# Patient Record
Sex: MALE
[De-identification: names, ages, dates, MRNs, and addresses within clinical notes are randomized per-mention and may not be internally consistent; named-entity substitution may affect disease eponyms.]

---

## 2020-08-18 ENCOUNTER — HOSPITAL ENCOUNTER (EMERGENCY)
Dept: HOSPITAL 88 - ER | Age: 85
Discharge: OTHER INSTITUTION | End: 2020-08-18
Payer: MEDICARE

## 2020-08-18 VITALS — HEIGHT: 65 IN | BODY MASS INDEX: 24.83 KG/M2 | WEIGHT: 149 LBS

## 2020-08-18 DIAGNOSIS — E78.00: ICD-10-CM

## 2020-08-18 DIAGNOSIS — J96.91: Primary | ICD-10-CM

## 2020-08-18 DIAGNOSIS — K21.9: ICD-10-CM

## 2020-08-18 LAB
ALBUMIN SERPL-MCNC: 2.4 G/DL (ref 3.5–5)
ALBUMIN/GLOB SERPL: 0.6 {RATIO} (ref 0.8–2)
ALP SERPL-CCNC: 69 IU/L (ref 40–150)
ALT SERPL-CCNC: 20 IU/L (ref 0–55)
ANION GAP SERPL CALC-SCNC: 23.4 MMOL/L (ref 8–16)
BACTERIA URNS QL MICRO: (no result) /HPF
BASE EXCESS BLDA CALC-SCNC: -4 MMOL/L (ref -2–3)
BASOPHILS # BLD AUTO: 0.1 10*3/UL (ref 0–0.1)
BASOPHILS NFR BLD AUTO: 0.5 % (ref 0–1)
BILIRUB UR QL: NEGATIVE
BUN SERPL-MCNC: 116 MG/DL (ref 7–26)
BUN/CREAT SERPL: 28 (ref 6–25)
CALCIUM SERPL-MCNC: 8.9 MG/DL (ref 8.4–10.2)
CHLORIDE SERPL-SCNC: 114 MMOL/L (ref 98–107)
CLARITY UR: CLEAR
CO2 BLDCOA CALC-SCNC: 22 MMOL/L
CO2 SERPL-SCNC: 20 MMOL/L (ref 22–29)
COLOR UR: YELLOW
DEPRECATED NEUTROPHILS # BLD AUTO: 19.2 10*3/UL (ref 2.1–6.9)
DEPRECATED RBC URNS MANUAL-ACNC: (no result) /HPF (ref 0–5)
EGFRCR SERPLBLD CKD-EPI 2021: 14 ML/MIN (ref 60–?)
EOSINOPHIL # BLD AUTO: 0 10*3/UL (ref 0–0.4)
EOSINOPHIL NFR BLD AUTO: 0.1 % (ref 0–6)
EPI CELLS URNS QL MICRO: (no result) /LPF
ERYTHROCYTE [DISTWIDTH] IN CORD BLOOD: 13.5 % (ref 11.7–14.4)
GLOBULIN PLAS-MCNC: 4.2 G/DL (ref 2.3–3.5)
GLUCOSE SERPLBLD-MCNC: 131 MG/DL (ref 74–118)
HCO3 BLDA-SCNC: 21 MMOL/L (ref 22–26)
HCT VFR BLD AUTO: 42.9 % (ref 38.2–49.6)
HGB BLD-MCNC: 14.3 G/DL (ref 14–18)
KETONES UR QL STRIP.AUTO: (no result)
LEUKOCYTE ESTERASE UR QL STRIP.AUTO: NEGATIVE
LYMPHOCYTES # BLD: 0.8 10*3/UL (ref 1–3.2)
LYMPHOCYTES NFR BLD AUTO: 3.6 % (ref 18–39.1)
MCH RBC QN AUTO: 32.2 PG (ref 28–32)
MCHC RBC AUTO-ENTMCNC: 33.3 G/DL (ref 31–35)
MCV RBC AUTO: 96.6 FL (ref 81–99)
MONOCYTES # BLD AUTO: 1.8 10*3/UL (ref 0.2–0.8)
MONOCYTES NFR BLD AUTO: 8.2 % (ref 4.4–11.3)
MUCOUS THREADS URNS QL MICRO: (no result)
NEUTS SEG NFR BLD AUTO: 86.8 % (ref 38.7–80)
NITRITE UR QL STRIP.AUTO: NEGATIVE
PCO2 BLDA: 31 MMHG (ref 35–45)
PCO2 BLDA: 56 MMHG (ref 80–105)
PH BLDA: 7.43 [PH] (ref 7.35–7.45)
PLATELET # BLD AUTO: 134 X10E3/UL (ref 140–360)
POTASSIUM SERPL-SCNC: 4.4 MMOL/L (ref 3.5–5.1)
PROT UR QL STRIP.AUTO: (no result)
RBC # BLD AUTO: 4.44 X10E6/UL (ref 4.3–5.7)
SAO2 % BLDA: 90 % (ref 95–98)
SODIUM SERPL-SCNC: 153 MMOL/L (ref 136–145)
SP GR UR STRIP: 1.02 (ref 1.01–1.02)
UROBILINOGEN UR STRIP-MCNC: 0.2 MG/DL (ref 0.2–1)
WBC #/AREA URNS HPF: (no result) /HPF (ref 0–5)

## 2020-08-18 PROCEDURE — 99285 EMERGENCY DEPT VISIT HI MDM: CPT

## 2020-08-18 PROCEDURE — 51700 IRRIGATION OF BLADDER: CPT

## 2020-08-18 PROCEDURE — 82805 BLOOD GASES W/O2 SATURATION: CPT

## 2020-08-18 PROCEDURE — 81001 URINALYSIS AUTO W/SCOPE: CPT

## 2020-08-18 PROCEDURE — 80053 COMPREHEN METABOLIC PANEL: CPT

## 2020-08-18 PROCEDURE — 31500 INSERT EMERGENCY AIRWAY: CPT

## 2020-08-18 PROCEDURE — 72125 CT NECK SPINE W/O DYE: CPT

## 2020-08-18 PROCEDURE — 94002 VENT MGMT INPAT INIT DAY: CPT

## 2020-08-18 PROCEDURE — 36415 COLL VENOUS BLD VENIPUNCTURE: CPT

## 2020-08-18 PROCEDURE — 87040 BLOOD CULTURE FOR BACTERIA: CPT

## 2020-08-18 PROCEDURE — 83880 ASSAY OF NATRIURETIC PEPTIDE: CPT

## 2020-08-18 PROCEDURE — 71250 CT THORAX DX C-: CPT

## 2020-08-18 PROCEDURE — 87086 URINE CULTURE/COLONY COUNT: CPT

## 2020-08-18 PROCEDURE — 93005 ELECTROCARDIOGRAM TRACING: CPT

## 2020-08-18 PROCEDURE — 84484 ASSAY OF TROPONIN QUANT: CPT

## 2020-08-18 PROCEDURE — 71045 X-RAY EXAM CHEST 1 VIEW: CPT

## 2020-08-18 PROCEDURE — 70450 CT HEAD/BRAIN W/O DYE: CPT

## 2020-08-18 PROCEDURE — 94003 VENT MGMT INPAT SUBQ DAY: CPT

## 2020-08-18 PROCEDURE — 83605 ASSAY OF LACTIC ACID: CPT

## 2020-08-18 PROCEDURE — 85025 COMPLETE CBC W/AUTO DIFF WBC: CPT

## 2020-08-18 NOTE — NUR
Midazolam drip obtained from pharmacy for sedation for intubation. Bag was 
spiked but the medication was not needed. Medication wasted due to being 
spiked. Kwan Ruiz RN ER director notified.

## 2020-08-18 NOTE — XMS REPORT
Continuity of Care Document

                             Created on: 2020



AUDREY HARPER

External Reference #: [id number]

: 1934

Sex: Male



Demographics





                          Address                   [address] [place]  [address]

 

                          Home Phone                [phone]

 

                          Preferred Language        English

 

                          Marital Status            Unknown

 

                          Hinduism Affiliation     Unknown

 

                          Race                      Unknown

 

                          Ethnic Group              Unknown





Author





                          Author                    [facility]

t

 

                          Organization              [facility]

 

                          Address                   [address]

[place]  [address]



 

                          Phone                     Unavailable







Support





                Name            Relationship    Address         Phone

 

                    NIKA VENTURA         PRS                 UNKNOWN

[place]  [address]                     [phone]

 

                    HERNESTO VENTURA        PRS                 [address] [place]  [address]                     [phone]

 

                    NONE,  GIVEN        PRS                 [address] [place]  [address]                     [phone]







Care Team Providers





                    Care Team Member Name Role                Phone

 

                          Unavailable               Unavailable







Payers





           Payer Name Policy Type Policy Number Effective Date Expiration Date S

ource







Problems

This patient has no known problems.



Allergies, Adverse Reactions, Alerts





        Allergy Name Allergy Type Status  Severity Reaction(s) Onset Date Inacti

ve Date 

Treating Clinician        Comments                  Source

 

       No Known Allergies DA     Active U             2019-09-15 00:00:00       

               [facility]

 

       No Known Contrast Allergies DA     Active U             2007 00:00:

00                      [facility]

 

       No Known Food Allergies DA     Active U             2007 00:00:00  

                    [facility]

 

       No Known Other Allergies DA     Active U             2007 00:00:00 

                     [facility]

 

       TETANUS DA     Active U             2007 00:00:00                  

    [facility]







Medications

This patient has no known medications.



Procedures

This patient has no known procedures.



Results





           Test Description Test Time  Test Comments Results    Result Comments 

Source

 

                INTERVERTEBRAL DISC 2020 15:43:00                 

--------------------------------------------------------------------------------

------------RUN DATE: 20                         [place] [facility]           
              PAGE 1   RUN TIME: 1543                            Specimen 
Inquiry                    RUN USER: INTERFACE                                  
                        
----------------------------------------------------------------
----------------------------PATIENT: HARPER VENTURA                  ACCT #: 
[id number] LOC:  V.ORTHO    U #: [id number]                                   
   AGE/SX: 86/M         ROOM: [facility]     RE20REG DR:  Tang Lei MD       :    [birth year]     BED:  A          DIS: 20             
                         STATUS: DIS IN       TLOC:           
----------------------------
---------------------------------------------------------------- SPEC #: 
[id number]     RECD:      STATUS:  CHITRA           REJACKIE #: 
[id number]                           ELIJAH: 20-         SUBM DR: Tang Lei MD        ENTERED:      SP TYPE: INT DISC       OTHR DR: 
No Primary or Family Physician                                                  
         Derek Higgins MD, Dang Thanh MD Qureshi, Salah Uddin MDORDERED:  GROSS                                     
                                        COPIES TO:   No Primary or Family 
Physician    Derek Higgins MD   [address] [place] [address]   
[phone]    Edna Pino MD   [address] [place] [address] 
[address] [phone]    Lisa Dover MD   [address]  [address]
  [place] [address] [phone]    Tang Lei MD   [address] 
[place] [address]   [phone] PROCEDURES: GROSS () 
TISSUES:           CERVICAL VERTEBRA, NOS - DISC         CLINICAL HISTORY    
COLLECTION DATE: 20       CERVICAL SPINAL STENOSIS WITH MYELOPATHY         
                          ** CONTINUED ON NEXT PAGE ** 
--------------------------------------------------------------------------------

------------RUN DATE: 20                         [facility]           
              PAGE 2   RUN TIME: 1543                            Specimen 
Inquiry                    RUN USER: INTERFACE                                  
                        
--------------------------------------------------------------------------------

------------SPEC #: [id number]    PATIENT: HARPER VENTURA                   
#[id number]  
(Continued)---------------------------------------------------------------------

-----------------------          FINAL DIAGNOSIS    Cervical disc, C4-5 and C5-
6, anterior cervical discectomy:        INTERVERTEBRAL DISC MATERIAL        BONE
       NEGATIVE FOR MALIGNANCY            DMW/kamryn   D   [address], [address]           
MACROSCOPIC    The specimen is received in formalin labeled with the patient's 
name, "cervical   disc" and consists of irregular fragments of light tan fibrous
type tissue   and small fragments of possible bone.  The specimen measures 3.0 x
2.5 x 0.5 cm   in aggregate.  Representative portions of tissue are submitted 
for   decalcification and follow-up histologic evaluation in a single cassette. 
     GROSS PERFORMED AT [facility]   [address] [place] [address]   [phone]       
   MICROSCOPIC    All of the stains, including any controls performed, stain   
appropriately.       MICROSCOPIC PERFORMED AT [facility]   [address] [place] [address]   [phone]
        PERFORMING SITE    Diagnosis performed at:        [facility]        [address] [place] [address]        
[phone]--------------------------------------------------------------------

------------------------ Signed SIGNATURE ON FILE                        
Alem Walker MD 20 1543 
--------------------------------------------------------------------------------

------------                                    ** END OF REPORT **             

                

 

                - XR C-SPINE 2-3 VIEWS 2020 09:41:00                  FAX:

 Derek Brito MD  

[phone]    Gleason: B   St: [facility] FAX: Tang Aguilar [facility]  
[phone]   
------------------------------------------------------------------------------- 
Name:   HARPER VENTURA                     [facility]                     :
1934  Age/S: 86/M           [address] [facility]                Unit #: 
[id number]      Loc: [address]       [place]  [address]              Phys: 
Derek Higgins MD                                                 Acct: 
[id number] Dis Date:               Status: ADM IN                             
   PHONE #: [phone]     Exam Date:     2020     09                 
 FAX #: [phone]     Reason: Status post fusion                             
   EXAMS:                                               CPT CODE:      [id number]
XR C-SPINE 2-3 VIEWS                       [address]38                    HISTORY: 
Post fusion.               Location: [facility].               COMPARISON: None 
available.               Cervical fusion with metallic plate and bone graft from
C4 through C6       is in gross anatomic alignment although limited on the 
lateral view       for C5 and C6 due to overlapping shoulder. Uncovertebral mora
ints are       narrowed. Soft tissue swelling from surgery. Osteopenia. Lung 
apices       are clear.                 IMPRESSION:                   Gross 
anatomic alignment from C4 through C6 with metallic internal         fixation 
plate and bone graft. C5 and C6 are partially obscured by         overlapping 
shoulder on the lateral view.          ** Electronically Signed by MARTI Leo on 2020 at 0941 **                      Reported and signed by: 
Raymond Leo M.D.                      CC: Derek Higgins MD; Tang Lei                                                                           
                    Technologist: May Lu, RT(R); Cathleen Dyer RT(R)    
        Trnscrd Date/Time/By: 2020 (0941) : By: [email or web address]           Orig 
Print D/T: S: 2020 (2428)                         PAGE  1                 
     Signed Report                                                           

 

                    COVID 19 Asymptomatic IH AG 2020 14:01:00   

 

                                        Test Item

 

             COVID 19 Asymptomatic IH AG (test code = COVNONPUIAG) NEGATIVE     

                           





- MRI C-SPINE W/O CONT2020-08-10 15:40:00 FAX: Derek Brito MD  
[phone]    Gleason: B   St: ADM FAX: Tang Aguilar  
[phone]   ----------------------------------------
---------------------------------------  Name:   HARPER VENTURA                   
 [facility]                     : 1934  Age/S: 86/M           [address]                Unit #: [id number]      Loc: [address]        [place],  
[place]  [address]              Phys: Derek Higgins MD                                
                Acct: [id number] Dis Date:               Status: ADM IN       
                         PHONE #: [phone]     Exam Date:     08/10/2020    
1522                   FAX #: [phone]     Reason: r/o cord contusion       
                         EXAMS:                                               
CPT CODE:      [id number] MRI C-SPINE W/O CONT                       [address]       
            REASON FOR EXAM: r/o cord contusion               Exam Order Date: 
8/10/2020 12:52 PM               Attending M.D.: Derek Higgins MD            
  Comparison: CT scan of the cervical spine the previous afternoon              
Procedure:  - MRI C-SPINE W/O CONT               FINDINGS: Sagittal and axial 
images of the cervical spine were       obtained in in T1, T2, and proton den
sity with fat saturation. No       intravenous gadolinium was given.            
  There is no fracture or spondylolisthesis of the cervical spine.              
The heights of the cervical spine are preserved. However there is       height 
loss of the intervertebral discs throughout the cervical spine.               C
2-C3: No foraminal narrowing or central canal narrowing.       C3-C4: Disc bulge
causes mild central canal narrowing and moderate to       severe bilateral fora
pavan narrowing.       C4-C5: Disc osteophyte complex causes severe narrowing of
the central       canal and bilateral neuroforamina.       C5-C6: Disc osteophy
te complex with facet degeneration results in       severe narrowing of the cent
ral canal and bilateral foramina.       C6-C7: Disc osteophyte complex results i
n moderate to severe central       canal narrowing and moderate to severe bilate
ral foraminal narrowing.       C7-T1: Central canal and neuroforamina are widely
patent.               There is increased T2 signal of the spinal cord from the 
level of C4       to the level of C6.                 IMPRESSION:         Severe
degenerative changes of the cervical spine with multilevel         foraminal and
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
ibed above.                   Location: [facility]          ** Electronically Signed by
Osmany Valenzuela MD on 08/10/2020 at 1540 **                      Reported and signed
by: Osmany Valenzuela MD      PAGE  1                       Signed Report             
      (CONTINUED)  FAX: Derek Brito MD  [phone]    Gleason:   
St: ADM FAX: Y       Tang Lei  [phone]   -------------------
------------------------------------------------------------  Name:   PAULA VENTURA                     [facility]                     : 1934  Age/S
: 86/M           [address] [facility]                Unit #: [id number]      Loc: [place]  [address]              Phys: Derek Hgigins MD          
                                      Acct: [id number] Dis Date:              
Status: ADM IN                                 PHONE #: [phone]     Exam 
Date:     08/10/2020     1522                   FAX #: [phone]     Reason: 
r/o cord contusion                                 EXAMS:                       
                       CPT CODE:      [id number] MRI C-SPINE W/O CONT            
          [address]               <Continued>                                       
CC: Derek Higgins MD; Tang Lei [facility]                                    
                                                           Technologist: RT TENZIN - MRI                                 Trnscrd Date/Time/By: 
08/10/2020 (1540) : By: [email or web address]          Orig Print D/T: S: 08/10/2020 (7505)
                        PAGE  2                       Signed Report             
                 - CT HEAD/BRAIN W/O CONT2020-08-10 07:24:00  Name: HARPER VENTURA
                     [facility]                     : 1934 Age/S: 
86  / M         [address] [facility]                Unit #: [id number]     Loc:     
         [place]  [address]              Phys: Heavenly Chaves NP               
                                    Acct: [id number]  Dis Date:               
Status: ADM IN                                  PHONE #: [phone]     Exam 
Date: 08/10/2020  0430                     FAX #: [phone]      Reason: SAH 
Eval                                            EXAMS:                          
                    CPT CODE:      [id number] CT HEAD/BRAIN W/O CONT             
       [address]                    HISTORY: Follow-up subarachnoid hemorrhage.     
         COMPARISON: Head CT from previous day.                       CT brain 
without contrast: Automated exposure control.               Location: [facility].      
        Trace right superior frontal subarachnoid hemorrhage noted again and    
  is unchanged. No new hemorrhage. No extra-axial collections are noted.       
No acute territorial vascular infarction is noted.               The sulci, 
gyri, ventricles and subarachnoid spaces and the basilar       cisterns are 
normal for patient's age. No herniation or hydrocephalus       or midline shift 
is noted.               Mild periventricular ischemic gliosis is noted. Age-
appropriate       atrophy is noted as well.               Portions of the 
visualized paranasal sinuses are normal.               No obvious bony calvarial
defect is noted.                 IMPRESSION:                   Trace right 
superior frontal subarachnoid hemorrhage is noted again         and unchanged. 
No new hemorrhage. No extra-axial collections.                   No acute alfonso
torial vascular infarction.                   No herniation or hydrocephalus or 
midline shift.                    Chronic white matter ischemic disease and atro
phy .                              ** Electronically Signed by MARTI Leo
on 08/10/2020 at 0724 **                      Reported and signed by: Raymond munoz M.D.          PAGE  1                       Signed Report                  
 (CONTINUED)   Name: HARPER VENTURA                      [facility]           
         : 1934 Age/S: 86  / M         [address]                
Unit #: [id number]     Loc:               [place]  [address]              Ph
ys: Heavenly Chaves NP                                                    Acct: [id number]
[id number]  Dis Date:               Status: ADM IN                              
   PHONE #: [phone]     Exam Date: 08/10/2020  043                     FAX
#: [phone]      Reason: SAH Eval                                           
EXAMS:                                               CPT CODE:      [id number] CT
HEAD/BRAIN W/O CONT                     [address]               <Continued>         
                             CC: Heavenly Chaves NP; Tang Lei          
                                                                                
       Technologist:NELIDA LAI, RT; Marquis WATKINS  CTDI:        DLP:        
Trnscb Date/Time: 08/10/2020 (724) t.[email or web address]                        Orig Print 
D/T: S: 08/10/2020 (727)      PAGE  2                       Signed Report      
                        COMPREHENSIVE METABOLIC PANEL2020-08-10 04:05:00* 



             Test Item    Value        Reference Range Interpretation Comments

 

             SODIUM (test code = NA) 138 mmol/L   136-145      N             

 

             POTASSIUM (test code = K) 3.8 mmol/L   3.5-5.1      N             

 

             CHLORIDE (test code = CL) 105.0 mmol/L        N             

 

             CARBON DIOXIDE (test code = CO2) 25.0 mmol/L  21-32        N       

      

 

             ANION GAP (test code = GAP) 11.8         10-20        N            

 

 

             GLUCOSE (test code = GLU) 92 mg/dL            N             

 

             BLOOD UREA NITROGEN (test code = BUN) 12 mg/dL     7-18         N  

           

 

             GLOMERULAR FILTRATION RATE (test code = GFR) > 60 mL/min  >=60     

                 Estimated GFR by

using Modified MDRD formula.Chronic kidney disease is defined as either kidney 
damageor GFR <60 mL/min/1.73 m2 for >3 months.

 

             CREATININE (test code = CREAT) 0.60 mg/dL   0.7-1.3      L         

    

 

             BUN/CREATININE RATIO (test code = BUN/CREA) 21.2         10-20     

   H             

 

             TOTAL PROTEIN (test code = PROT) 6.3 gram/dL  6.4-8.2      L       

      

 

             ALBUMIN (test code = ALB) 3.0 g/dL     3.4-5.0      L             

 

             GLOBULIN (test code = GLOB) 3.3 gram/dL  2.7-4.2      N            

 

 

             ALBUMIN/GLOBULIN RATIO (test code = A/G) 0.9          0.75-1.50    

N             

 

             CALCIUM (test code = CA) 8.0 mg/dL    8.5-10.1     L             

 

             BILIRUBIN TOTAL (test code = BILT) 0.60 mg/dL   0.0-1.0      N     

        

 

             SGOT/AST (test code = AST) 16 IUnit/L   15-37        N             

 

             SGPT/ALT (test code = ALT) 15 IUnit/L   12-78        N             

 

             ALKALINE PHOSPHATASE TOTAL (test code = ALKP) 66 IUnit/L     

     N            **Note change 

in reference range due to change in reagent.**





PHOSPHORUS2020-08-10 04:05:00* 



             Test Item    Value        Reference Range Interpretation Comments

 

             PHOSPHORUS (test code = PHOS) 3.4 mg/dL    2.5-4.9      N          

   





MAGNESIUM2020-08-10 04:05:00* 



             Test Item    Value        Reference Range Interpretation Comments

 

             MAGNESIUM (test code = MAG) 2.4 mg/dL    1.8-2.4      N            

 





CALCIUM IONIZED2020-08-10 04:05:00* 



             Test Item    Value        Reference Range Interpretation Comments

 

             CALCIUM IONIZED (test code = LALO) 1.18 mmol/L  1.12-1.32    N      

       





COMPREHENSIVE METABOLIC PANEL2020-08-10 03:34:00* 



             Test Item    Value        Reference Range Interpretation Comments

 

             SODIUM (test code = NA) 138 mmol/L   136-145      N             

 

             POTASSIUM (test code = K) 3.8 mmol/L   3.5-5.1      N             

 

             CHLORIDE (test code = CL) 105.0 mmol/L        N             

 

             CARBON DIOXIDE (test code = CO2) 25.0 mmol/L  21-32        N       

      

 

             ANION GAP (test code = GAP) 11.8         10-20        N            

 

 

             GLUCOSE (test code = GLU) 92 mg/dL            N             

 

             BLOOD UREA NITROGEN (test code = BUN) 12 mg/dL     7-18         N  

           

 

             GLOMERULAR FILTRATION RATE (test code = GFR) > 60 mL/min  >=60     

                 Estimated GFR by

using Modified MDRD formula.Chronic kidney disease is defined as either kidney 
damageor GFR <60 mL/min/1.73 m2 for >3 months.

 

             CREATININE (test code = CREAT) 0.60 mg/dL   0.7-1.3      L         

    

 

             BUN/CREATININE RATIO (test code = BUN/CREA) 21.2         10-20     

   H             

 

             TOTAL PROTEIN (test code = PROT) 6.3 gram/dL  6.4-8.2      L       

      

 

             ALBUMIN (test code = ALB) 3.0 g/dL     3.4-5.0      L             

 

             GLOBULIN (test code = GLOB) 3.3 gram/dL  2.7-4.2      N            

 

 

             ALBUMIN/GLOBULIN RATIO (test code = A/G) 0.9          0.75-1.50    

N             

 

             CALCIUM (test code = CA) 8.0 mg/dL    8.5-10.1     L             

 

             BILIRUBIN TOTAL (test code = BILT) 0.60 mg/dL   0.0-1.0      N     

        

 

             SGOT/AST (test code = AST) 16 IUnit/L   15-37        N             

 

             SGPT/ALT (test code = ALT) 15 IUnit/L   12-78        N             

 

             ALKALINE PHOSPHATASE TOTAL (test code = ALKP) 66 IUnit/L     

     N            **Note change 

in reference range due to change in reagent.**





PHOSPHORUS2020-08-10 03:34:00* 



             Test Item    Value        Reference Range Interpretation Comments

 

             PHOSPHORUS (test code = PHOS) 3.4 mg/dL    2.5-4.9      N          

   





MAGNESIUM2020-08-10 03:34:00* 



             Test Item    Value        Reference Range Interpretation Comments

 

             MAGNESIUM (test code = MAG) 2.4 mg/dL    1.8-2.4      N            

 





CALCIUM IONIZED2020-08-10 03:34:00* 



             Test Item    Value        Reference Range Interpretation Comments

 

             CALCIUM IONIZED (test code = LALO)  mmol/L      1.12-1.32           

       





COMPREHENSIVE METABOLIC PANEL2020-08-10 03:13:00* 



             Test Item    Value        Reference Range Interpretation Comments

 

             SODIUM (test code = NA) 138 mmol/L   136-145      N             

 

             POTASSIUM (test code = K) 3.8 mmol/L   3.5-5.1      N             

 

             CHLORIDE (test code = CL) 105.0 mmol/L        N             

 

             CARBON DIOXIDE (test code = CO2)  mmol/L      21-32                

      

 

             ANION GAP (test code = GAP)              10-20                     

 

 

             GLUCOSE (test code = GLU)  mg/dL                            

 

             BLOOD UREA NITROGEN (test code = BUN)  mg/dL       7-18            

           

 

             GLOMERULAR FILTRATION RATE (test code = GFR)  mL/min      >=60     

                  

 

             CREATININE (test code = CREAT)  mg/dL       0.7-1.3                

    

 

             BUN/CREATININE RATIO (test code = BUN/CREA)              10-20     

                 

 

             TOTAL PROTEIN (test code = PROT)  gram/dL     6.4-8.2              

      

 

             ALBUMIN (test code = ALB)  g/dL        3.4-5.0                    

 

             GLOBULIN (test code = GLOB)  gram/dL     2.7-4.2                   

 

 

             ALBUMIN/GLOBULIN RATIO (test code = A/G)              0.75-1.50    

              

 

             CALCIUM (test code = CA)  mg/dL       8.5-10.1                   

 

             BILIRUBIN TOTAL (test code = BILT)  mg/dL       0.0-1.0            

        

 

             SGOT/AST (test code = AST)  IUnit/L     15-37                      

 

             SGPT/ALT (test code = ALT)  IUnit/L     12-78                      

 

             ALKALINE PHOSPHATASE TOTAL (test code = ALKP)  IUnit/L       

                   





PHOSPHORUS2020-08-10 03:13:00* 



             Test Item    Value        Reference Range Interpretation Comments

 

             PHOSPHORUS (test code = PHOS)  mg/dL       2.5-4.9                 

   





MAGNESIUM2020-08-10 03:13:00* 



             Test Item    Value        Reference Range Interpretation Comments

 

             MAGNESIUM (test code = MAG)  mg/dL       1.8-2.4                   

 





CALCIUM IONIZED2020-08-10 03:13:00* 



             Test Item    Value        Reference Range Interpretation Comments

 

             CALCIUM IONIZED (test code = LALO)  mmol/L      1.12-1.32           

       





CBC W/AUTO DIFF2020-10 03:08:00* 



             Test Item    Value        Reference Range Interpretation Comments

 

             WHITE BLOOD CELL (test code = WBC) 9.7 K/mm3    4.5-12.5     N     

        

 

             RED BLOOD CELL (test code = RBC) 4.23 mill/mm3 4.0-5.8      N      

       

 

             HEMOGLOBIN (test code = HGB) 13.6 gram/dL 13.0-17.5    N           

  

 

             HEMATOCRIT (test code = HCT) 40.7 %       42.0-52.0    L           

  

 

             MEAN CELL VOLUME (test code = MCV) 96.2 fL      80-98        N     

        

 

             MEAN CELL HGB (test code = MCH) 32.2 picogram 27.0-33.0    N       

      

 

             MEAN CELL HGB CONCETRATION (test code = MCHC) 33.4 gram/dL 33.0-36.

0    N             

 

             RED CELL DISTRIBUTION WIDTH (test code = RDW) 12.5 %       11.6-16.

2    N             

 

             RED CELL DISTRIBUTION WIDTH SD (test code = RDW-SD) 43.9 fL      37

.0-51.0    N             

 

             PLATELET COUNT (test code = PLT) 197 K/mm3    150-450      N       

      

 

             MEAN PLATELET VOLUME (test code = MPV) 10.4 fL      6.7-11.0     N 

            

 

             NEUTROPHIL % (test code = NT%) 69.8 %       39.0-69.0    H         

    

 

             IMMATURE GRANULOCYTE % (test code = IG%) 0.3 %        0.0-5.0      

N             

 

             LYMPHOCYTE % (test code = LY%) 18.7 %       25.0-55.0    L         

    

 

             MONOCYTE % (test code = MO%) 10.3 %       0.0-10.0     H           

  

 

             EOSINOPHIL % (test code = EO%) 0.5 %        0.0-5.0      N         

    

 

             BASOPHIL % (test code = BA%) 0.4 %        0.0-1.0      N           

  

 

             NUCLEATED RBC % (test code = NRBC%) 0.0 %        0-0          N    

         

 

             NEUTROPHIL # (test code = NT#) 6.77 K/mm3   1.8-7.7      N         

    

 

             IMMATURE GRANULOCYTE # (test code = IG#) 0.03 x10 3/uL 0-0.03      

 N             

 

             LYMPHOCYTE # (test code = LY#) 1.82 K/mm3   1.0-5.0      N         

    

 

             MONOCYTE # (test code = MO#) 1.00 K/mm3   0-0.8        H           

  

 

             EOSINOPHIL # (test code = EO#) 0.05 K/mm3   0.0-0.5      N         

    

 

             BASOPHIL # (test code = BA#) 0.04 K/mm3   0.0-0.2      N           

  

 

             NUCLEATED RBC # (test code = NRBC#) 0.00 K/mm3   0.0-0.1      N    

         

 

             MANUAL DIFF REQUIRED (test code = MDIFF) NO                        

              





PROTHROMBIN TIME2020-08-10 03:03:00* 



             Test Item    Value        Reference Range Interpretation Comments

 

             PROTHROMBIN TIME PATIENT (test code = PTP) 13.1 seconds 9.0-14.0   

  N             

 

             INTERNATIONAL NORMAL RATIO (test code = INR) 1.1          0.8-1.2  

    N            The therapeutic range

for oral anticoagulant therapy formost indications is an international 
normalized ratio (INR)of between 2.0 and 3.0.  The recommended therapeutic 
INRrange for various clinical situations is listed 
below:_________________________________________________________Clinical 
Situation                          INR 
range_________________________________________________________ Pulmonary e
mbolism treatment              (2.0-3.0)Venous thrombosis treatmentVenous 
thrombosis prophylaxis (high risk surgery)Prevention of systemic embolism from: 
       Acute myocardial infarction         Valvular heart disease         Atrial
fibrillation Mechanical prosthetic heart valves          (2.5-3.5)





IS PATIENT ON ANTICOAGULANTS? N- DUP AB/PEL/SC [id number] 23:26:00  [facility]                     : 
1934 Age/S: 86  / M         [address] [facility]                [address]: [id number]     Loc:               [place]  [address]              Phys: Tang Lei MD                                             Acct: [id number]  Dis
Date:               Status: ADM IN                                  PHONE #: [phone]
[phone]     Exam Date: 2020  0990                     FAX #: [phone]
[id number]      Reason: ENLARGE SCROTUM                                     EXAMS:     
                                         CPT CODE:      [id number] DUP AB/PEL/SC 
COMP                         87279                    HISTORY: Pain       Locat
ion: C3        FINDINGS:               Sonographic images of the scrotum were ob
tained.  The testicles are       mildly heterogeneous in appearance bilaterally 
with testicular flow       noted bilaterally.  Small right hydrocele in the larg
e left hydrocele       is demonstrated.  No discrete testicular mass identified.
                IMPRESSION:                   1. Small right-sided hydrocele wi
th large left hydrocele.         2.  Normal testicular flow bilaterally.        
 ** Electronically Signed by Jerzy Ruiz MD on 2020 at 2326 **          
           Reported and signed by: Jerzy Ruiz MD                           
CC: Tang Lei                                                         
                                                           Technologist: NEREIDA FOSTER                                       Trnscb Date/Time: 2020 (2
326) [id number]                       Orig Print D/T: S: 2020 (0125)     P
robe:                       PAGE  1                       Signed Report         
                     - US SCROTUM AND [id number] 23:26:00  Name: 
HARPER VENTURA                      [facility]                     : 
1934 Age/S: 86  / M         [address]                [address] #: [id number]     Loc:               [place]  [address]              Phys: Brunilda Wilkinson MD                                                Acct: [id number]  Di
s Date:               Status: ADM IN                                  PHONE #: [phone]
[phone]     Exam Date: 2020                     FAX #: [phone]
[phone]      Reason: scrotal enlargement, urinary retention              EXAMS:     
                                         CPT CODE:      [id number] US SCROTUM AND
CNTS                        08302                    HISTORY: Pain       Locat
ion: C3        FINDINGS:               Sonographic images of the scrotum were ob
tained.  The testicles are       mildly heterogeneous in appearance bilaterally 
with testicular flow       noted bilaterally.  Small right hydrocele in the larg
e left hydrocele       is demonstrated.  No discrete testicular mass identified.
                IMPRESSION:                   1. Small right-sided hydrocele wi
th large left hydrocele.         2.  Normal testicular flow bilaterally.        
 ** Electronically Signed by Jerzy Ruiz MD on 2020 at 2326 **          
           Reported and signed by: Jerzy Ruiz MD                           
CC: Laura Wilkinson MD; Tang Lei                                   
                                                           Technologist: NEREIDA FOSTER                                       Trnscb Date/Time: 2020 (2
326) [id number]                       Orig Print D/T: S: 2020 (0107)     P
robe:                       PAGE  1                       Signed Report         
                     URINALYSIS [id number] 19:00:00* 



             Test Item    Value        Reference Range Interpretation Comments

 

             UA COLOR (test code = COLU) COLORLESS    YELLOW       A            

 

 

             UA APPEARANCE (test code = APPU) CLEAR        CLEAR                

      

 

             UA GLUCOSE DIPSTICK (test code = DGLUU) NEGATIVE mg/dL NEGATIVE    

               

 

             UA BILIRUBIN DIPSTICK (test code = BILU) NEGATIVE mg/dL NEGATIVE   

                

 

             UA KETONE DIPSTICK (test code = KETU) TRACE mg/dL  NEGATIVE     A  

           

 

             UA SPECIFIC GRAVITY (test code = SGU) 1.005        1.001-1.035     

           

 

             UA BLOOD DIPSTICK (test code = JANE) 0.03 mg/dL (Trace) mg/dL NEGATI

VE     A             

 

             UA PH DIPSTICK (test code = NOEMÍ) 7.5          5.0-8.0              

      

 

             UA PROTEIN DIPSTICK (test code = PROU) NEGATIVE mg/dL NEGATIVE     

              

 

             UA UROBILINIOGEN DIPSTICK (test code = URO) Normal mg/dL NEGATIVE  

                 

 

             UA NITRITE DIPSTICK (test code = CAROL) NEGATIVE     NEGATIVE       

            

 

             UA LEUKOCYTE ESTERASE W REFLEX (test code = LEUUR) NEGATIVE Vick/uL 

NEGATIVE                   

 

             UA WBC (test code = WBCU)  per HPF     0-5                        

 

             UA RBC (test code = RBCU)  per HPF     0-5                        

 

             UA EPITHELIAL CELLS (test code = EPIU)  per HPF     Few            

            

 

             UA BACTERIA (test code = BACU)  per HPF     NONE                   

    





Urine Source? Clean CatchDRUGS OF ABUSE SCREEN [id number] 19:00:00* 



             Test Item    Value        Reference Range Interpretation Comments

 

             URN COCAINE (test code = COCAURN) NEGATIVE     <300 ng/mL          

       

 

             URN CANNABINOIDS (test code = CANNABURN) NEGATIVE     <50 ng/mL    

              

 

             URN AMPHETAMINE (test code = AMPHETURN) NEGATIVE     <1000 ng/mL   

             

 

             URN BARBITURATE (test code = BARBITURN) NEGATIVE     <200 ng/mL    

             

 

             URN BENZODIAZEPINE (test code = BENZOURN) NEGATIVE     <200 ng/mL  

               

 

             URN OPIATES (test code = OPIATURN) NEGATIVE     <300 ng/mL         

        

 

             URN PHENCYCLIDINE (PCP) (test code = PHENCURN) NEGATIVE     <25 ng/

mL                  

 

             URN METHADONE (test code = METHAURN) NEGATIVE     <300 ng/mL       

          





Urine Source? Clean CatchURINALYSIS [id number] 19:00:00* 



             Test Item    Value        Reference Range Interpretation Comments

 

             UA COLOR (test code = COLU) COLORLESS    YELLOW       A            

 

 

             UA APPEARANCE (test code = APPU) CLEAR        CLEAR                

      

 

             UA GLUCOSE DIPSTICK (test code = DGLUU) NEGATIVE mg/dL NEGATIVE    

               

 

             UA BILIRUBIN DIPSTICK (test code = BILU) NEGATIVE mg/dL NEGATIVE   

                

 

             UA KETONE DIPSTICK (test code = KETU) TRACE mg/dL  NEGATIVE     A  

           

 

             UA SPECIFIC GRAVITY (test code = SGU) 1.005        1.001-1.035     

           

 

             UA BLOOD DIPSTICK (test code = JANE) 0.03 mg/dL (Trace) mg/dL NEGATI

VE     A             

 

             UA PH DIPSTICK (test code = NOEMÍ) 7.5          5.0-8.0              

      

 

             UA PROTEIN DIPSTICK (test code = PROU) NEGATIVE mg/dL NEGATIVE     

              

 

             UA UROBILINIOGEN DIPSTICK (test code = URO) Normal mg/dL NEGATIVE  

                 

 

             UA NITRITE DIPSTICK (test code = CAROL) NEGATIVE     NEGATIVE       

            

 

             UA LEUKOCYTE ESTERASE W REFLEX (test code = LEUUR) NEGATIVE Vick/uL 

NEGATIVE                   

 

             UA WBC (test code = WBCU) 0-5 per HPF  0-5                        

 

             UA RBC (test code = RBCU) 3-5 #/HPF    0-5                        

 

             UA EPITHELIAL CELLS (test code = EPIU) None seen per HPF FEW       

                 

 

             UA BACTERIA (test code = BACU) NONE SEEN #/HPF NONE                

       





Urine Source? Clean CatchDRUGS OF ABUSE SCREEN [id number] 19:00:00* 



             Test Item    Value        Reference Range Interpretation Comments

 

             URN COCAINE (test code = COCAURN) NEGATIVE     <300 ng/mL          

       

 

             URN CANNABINOIDS (test code = CANNABURN) NEGATIVE     <50 ng/mL    

              

 

             URN AMPHETAMINE (test code = AMPHETURN) NEGATIVE     <1000 ng/mL   

             

 

             URN BARBITURATE (test code = BARBITURN) NEGATIVE     <200 ng/mL    

             

 

             URN BENZODIAZEPINE (test code = BENZOURN) NEGATIVE     <200 ng/mL  

               

 

             URN OPIATES (test code = OPIATURN) NEGATIVE     <300 ng/mL         

        

 

             URN PHENCYCLIDINE (PCP) (test code = PHENCURN) NEGATIVE     <25 ng/

mL                  

 

             URN METHADONE (test code = METHAURN) NEGATIVE     <300 ng/mL       

          





Urine Source? Clean CatchURINALYSIS [id number] 18:57:00* 



             Test Item    Value        Reference Range Interpretation Comments

 

             UA COLOR (test code = COLU)              YELLOW                    

 

 

             UA APPEARANCE (test code = APPU)              CLEAR                

      

 

             UA BILIRUBIN DIPSTICK (test code = BILU)              NEGATIVE     

              

 

             UA SPECIFIC GRAVITY (test code = SGU)              1.001-1.035     

           

 

             UA PH DIPSTICK (test code = NOEMÍ)              5.0-8.0              

      

 

             UA UROBILINIOGEN DIPSTICK (test code = URO)  mg/dL       0.0-0.2   

                 

 

             UA NITRITE DIPSTICK (test code = CAROL)              NEGATIVE       

            

 

             UA LEUKOCYTE ESTERASE W REFLEX (test code = LEUUR)              NEG

ATIVE                   

 

             UA WBC (test code = WBCU)  per HPF     0-5                        

 

             UA RBC (test code = RBCU)  per HPF     0-5                        

 

             UA EPITHELIAL CELLS (test code = EPIU)  per HPF     Few            

            

 

             UA BACTERIA (test code = BACU)  per HPF     NONE                   

    





Urine Source? Clean CatchDRUGS OF ABUSE SCREEN [id number] 18:57:00* 



             Test Item    Value        Reference Range Interpretation Comments

 

             URN COCAINE (test code = COCAURN) NEGATIVE     <300 ng/mL          

       

 

             URN CANNABINOIDS (test code = CANNABURN) NEGATIVE     <50 ng/mL    

              

 

             URN AMPHETAMINE (test code = AMPHETURN) NEGATIVE     <1000 ng/mL   

             

 

             URN BARBITURATE (test code = BARBITURN) NEGATIVE     <200 ng/mL    

             

 

             URN BENZODIAZEPINE (test code = BENZOURN) NEGATIVE     <200 ng/mL  

               

 

             URN OPIATES (test code = OPIATURN) NEGATIVE     <300 ng/mL         

        

 

             URN PHENCYCLIDINE (PCP) (test code = PHENCURN) NEGATIVE     <25 ng/

mL                  

 

             URN METHADONE (test code = METHAURN) NEGATIVE     <300 ng/mL       

          





Urine Source? Clean CatchB-TYPE NATRIURETIC [id number] 17:34:00* 



             Test Item    Value        Reference Range Interpretation Comments

 

             B-TYPE NATRIURETIC PEPTIDE (test code = BNP) 571.14 pgram/mL 0-100 

       H             





BASIC METABOLIC [id number] 17:14:00* 



             Test Item    Value        Reference Range Interpretation Comments

 

             SODIUM (test code = NA) 135 mmol/L   136-145      L             

 

             POTASSIUM (test code = K) 3.9 mmol/L   3.5-5.1      N             

 

             CHLORIDE (test code = CL) 102.0 mmol/L        N             

 

             CARBON DIOXIDE (test code = CO2) 27.0 mmol/L  21-32        N       

      

 

             ANION GAP (test code = GAP) 9.9          10-20        L            

 

 

             GLUCOSE (test code = GLU) 87 mg/dL            N             

 

             BLOOD UREA NITROGEN (test code = BUN) 14 mg/dL     7-18         N  

           

 

             GLOMERULAR FILTRATION RATE (test code = GFR) > 60 mL/min  >=60     

                 Estimated GFR by

using Modified MDRD formula.Chronic kidney disease is defined as either kidney 
damageor GFR <60 mL/min/1.73 m2 for >3 months.

 

             CREATININE (test code = CREAT) 0.70 mg/dL   0.7-1.3      N         

    

 

             BUN/CREATININE RATIO (test code = BUN/CREA) 19.2         10-20     

   N             

 

             CALCIUM (test code = CA) 8.2 mg/dL    8.5-10.1     L             





HEPATIC FUNCTION [id number] 17:14:00* 



             Test Item    Value        Reference Range Interpretation Comments

 

             TOTAL PROTEIN (test code = PROT) 7.1 gram/dL  6.4-8.2      N       

      

 

             ALBUMIN (test code = ALB) 3.4 g/dL     3.4-5.0      N             

 

             GLOBULIN (test code = GLOB) 3.7 gram/dL  2.7-4.2      N            

 

 

             ALBUMIN/GLOBULIN RATIO (test code = A/G) 0.9          0.75-1.50    

N             

 

             BILIRUBIN TOTAL (test code = BILT) 0.40 mg/dL   0.0-1.0      N     

        

 

             BILIRUBIN DIRECT (test code = BILD) 0.12 mg/dL   0.0-0.20     N    

         

 

             SGOT/AST (test code = AST) 16 IUnit/L   15-37        N             

 

             SGPT/ALT (test code = ALT) 18 IUnit/L   12-78        N             

 

             ALKALINE PHOSPHATASE TOTAL (test code = ALKP) 71 IUnit/L     

     N            **Note change 

in reference range due to change in reagent.**





CREATINE KINASE (CK)2020 17:14:00* 



             Test Item    Value        Reference Range Interpretation Comments

 

             CREATINE KINASE (CK) (test code = CK) 179 IUnit/L         N  

           





[id number] 17:14:00* 



             Test Item    Value        Reference Range Interpretation Comments

 

             LIPASE (test code = LIP) 130 U/L      73.0-393.0   N             





[id number] 17:14:00* 



             Test Item    Value        Reference Range Interpretation Comments

 

             MAGNESIUM (test code = MAG) 2.3 mg/dL    1.8-2.4      N            

 





[id number] 17:14:00* 



             Test Item    Value        Reference Range Interpretation Comments

 

             TROPONIN-I (test code = TROPI) 0.017 ng/mL  0-0.045      N         

    





[id number] 17:14:00* 



             Test Item    Value        Reference Range Interpretation Comments

 

             ALCOHOL (test code = ALC) < 3 mg/dL    0.0-3.0      N            --

---------------INTERPRETIVE DATA

NOTE:-------------------- POSITIVE SCREENING RESULTS SHOULD BE CONSIDERED 
PRESUMPTIVE.WHEN COLLECTED FOR MEDICAL PURPOSES ONLY.  SPECIMEN WILL NOTBE 
COLLECTED BY CHAIN OF CUSTODY.IF A CONFIRMATION OF POSITIVE RESULTS IS DESIRED, 
ACONFIRMATION TEST MUST BE REQUESTED BY THE PHYSICIAN AT ANADDITIONAL CHARGE TO 
THE PATIENT.





BASIC METABOLIC [id number] 17:06:00* 



             Test Item    Value        Reference Range Interpretation Comments

 

             SODIUM (test code = NA) 135 mmol/L   136-145      L             

 

             POTASSIUM (test code = K) 3.9 mmol/L   3.5-5.1      N             

 

             CHLORIDE (test code = CL) 102.0 mmol/L        N             

 

             CARBON DIOXIDE (test code = CO2)  mmol/L      21-32                

      

 

             ANION GAP (test code = GAP)              10-20                     

 

 

             GLUCOSE (test code = GLU)  mg/dL                            

 

             BLOOD UREA NITROGEN (test code = BUN)  mg/dL       7-18            

           

 

             GLOMERULAR FILTRATION RATE (test code = GFR)  mL/min      >=60     

                  

 

             CREATININE (test code = CREAT)  mg/dL       0.7-1.3                

    

 

             BUN/CREATININE RATIO (test code = BUN/CREA)              10-20     

                 

 

             CALCIUM (test code = CA)  mg/dL       8.5-10.1                   





HEPATIC FUNCTION [id number] 17:06:00* 



             Test Item    Value        Reference Range Interpretation Comments

 

             TOTAL PROTEIN (test code = PROT)  gram/dL     6.4-8.2              

      

 

             ALBUMIN (test code = ALB)  g/dL        3.4-5.0                    

 

             GLOBULIN (test code = GLOB)  gram/dL     2.7-4.2                   

 

 

             ALBUMIN/GLOBULIN RATIO (test code = A/G)              0.75-1.50    

              

 

             BILIRUBIN TOTAL (test code = BILT)  mg/dL       0.0-1.0            

        

 

             BILIRUBIN DIRECT (test code = BILD)  mg/dL       0.0-0.20          

         

 

             SGOT/AST (test code = AST)  IUnit/L     15-37                      

 

             SGPT/ALT (test code = ALT)  IUnit/L     12-78                      

 

             ALKALINE PHOSPHATASE TOTAL (test code = ALKP)  IUnit/L       

                   





CREATINE KINASE (CK)2020 17:06:00* 



             Test Item    Value        Reference Range Interpretation Comments

 

             CREATINE KINASE (CK) (test code = CK)  IUnit/L               

           





[id number] 17:06:00* 



             Test Item    Value        Reference Range Interpretation Comments

 

             LIPASE (test code = LIP)  U/L         73.0-393.0                 





[id number] 17:06:00* 



             Test Item    Value        Reference Range Interpretation Comments

 

             MAGNESIUM (test code = MAG)  mg/dL       1.8-2.4                   

 





[id number] 17:06:00* 



             Test Item    Value        Reference Range Interpretation Comments

 

             TROPONIN-I (test code = TROPI)  ng/mL       0-0.045                

    





[id number] 17:06:00* 



             Test Item    Value        Reference Range Interpretation Comments

 

             ALCOHOL (test code = ALC)  mg/dL       0-3                        





- CT C-SPINE W/O [id number] 17:02:00  Name: HARPER VENTURA                
     [facility]                     : 1934 Age/S: 86  / M         
[address] [facility]                Unit #: [id number]     Loc:               
[place]  [address]              Phys: Laura Wilkinson MD                    
                           Acct: [id number]  Dis Date:               Status: 
REG ER                                  PHONE #: [phone]     Exam Date: 
2020  1642                     FAX #: [phone]      Reason: dizzy, 
fall                                         EXAMS:                             
                 CPT CODE:      [id number] CT C-SPINE W/O CONTRAST               
    [id number]                    HISTORY:  dizzy, fall               TECHNIQUE: 
Noncontrast 2.5 mm axial CT of the head. 2.5 mm axial CT of       the cervical 
spine. Sagittal and coronal reformatted images were       generated. . 
Examination acquired within 24 hours of arrival.       Automated exposure 
control for dose reduction.               COMPARISON: Brain MRI and head CT 
2020               FINDINGS:               HEAD CT:       No lacerations or
contusions of the scalp or facial soft tissues.        Calvarium and skull base 
are intact.               No acte or chronic infarct. No effacement of the sulci
or grey-white       matter interface. No acute hemorrhage. No intracranial mass,
mass       effect, or midline shift.                Mild generalized atrophy. 
Mild patchy low densities appearance of the       paraventricular region noted 
consistent with nonspecific white matter       disease.               No hydro
cephalus.. Hyperdensity seen along the right superior frontal       sulcus on se
fran 3 image 51 measures 0.5 cm. Additional punctate       hyperdensity within t
he right precentral sulcus on series 3 image 56       is also noted.            
  Visualized paranasal sinuses are clear.        Mastoid air cells and middle e
ar cavities are clear.        Orbital contents are unremarkable.                
CERVICAL SPINE CT:       No acute fracture of the cervical spine. No subluxation
.       Craniocervical and cervicothoracic articulations are appropriate.       
        Vertebral body heights are preserved.       Moderate to severe chronic 
degenerative changes of the cervical spine       most significant at C5-C6. No p
revertebral or paraspinal soft tissue       abnormality.        Visualized poste
rior fossa contents are grossly unremarkable.        Lung apices are clear.     
                    IMPRESSION:     PAGE  1                       Signed Report 
                  (CONTINUED)   Name: HARPER VENTURA [place]                     : 1934 Age/S: 86  / M         [address] [facility]                Unit #: [id number]     Loc:               [place], [place]  [address]              Phys: Laura Wilkinson MD                                      
         Acct: [id number]  Dis Date:               Status: REG ER             
                    PHONE #: [phone]     Exam Date: 2020  1642       
             FAX #: [phone]      Reason: dizzy, fall                       
                 EXAMS:                                               CPT CODE: 
    [id number] CT C-SPINE W/O CONTRAST                    44454               <
Continued>                    Small acute subarachnoid hemorrhage along the 
right superior frontal         and precentral sulci.         No acute 
abnormalities of the cervical spine.         Mild generalized atrophy.         
Moderate chronic microvascular ischemic changes.                   Above 
findings discussed with Dr. Wilkinson at 5:01 PM on 20.                   
Location: [facility]          ** Electronically Signed by Maged Cornelius M.D. on 
2020 at 1702 **                      Reported and signed by: Maged Cornelius M.D.                            CC: Laura Wilkinson MD                        
                                                                                
          Technologist:Amna Thakur RT(R),CT;   CTDI:        DLP:        
Trnscb Date/Time: 2020 (170) [email or web address]                       Orig Print 
D/T: S: 2020 (1398)      PAGE  2                       Signed Report      
                        - CT HEAD/BRAIN W/O [id number] 17:02:00  Name: 
HARPER VENTURA                      [facility]                     : 
1934 Age/S: 86  / M         [address] [facility]                Unit #: [id number]
[id number]     Loc:               [place]  [address]              Phys: Brunilda Wilkinson MD                                                Acct: [id number]  Di
s Date:               Status: REG ER                                  PHONE #: [phone]
[phone]     Exam Date: 2020  1642                     FAX #: [phone]
[phone]      Reason: dizzy, fall                                         EXAMS:     
                                         CPT CODE:      [id number] CT HEAD/BRAIN 
W/O CONT                     13479                    HISTORY:  dizzy, fall     
         TECHNIQUE: Noncontrast 2.5 mm axial CT of the head. 2.5 mm axial CT of 
     the cervical spine. Sagittal and coronal reformatted images were       g
enerated. . Examination acquired within 24 hours of arrival.       Automated exp
osure control for dose reduction.               COMPARISON: Brain MRI and head C
T 2020               FINDINGS:               HEAD CT:       No lacerations 
or contusions of the scalp or facial soft tissues.        Calvarium and skull ba
se are intact.               No acte or chronic infarct. No effacement of the farley
lci or grey-white       matter interface. No acute hemorrhage. No intracranial m
ass, mass       effect, or midline shift.                Mild generalized atroph
y. Mild patchy low densities appearance of the       paraventricular region note
d consistent with nonspecific white matter       disease.               No hydro
cephalus.. Hyperdensity seen along the right superior frontal       sulcus on se
fran 3 image 51 measures 0.5 cm. Additional punctate       hyperdensity within t
he right precentral sulcus on series 3 image 56       is also noted.            
  Visualized paranasal sinuses are clear.        Mastoid air cells and middle e
ar cavities are clear.        Orbital contents are unremarkable.                
CERVICAL SPINE CT:       No acute fracture of the cervical spine. No subluxation
.       Craniocervical and cervicothoracic articulations are appropriate.       
        Vertebral body heights are preserved.       Moderate to severe chronic 
degenerative changes of the cervical spine       most significant at C5-C6. No p
revertebral or paraspinal soft tissue       abnormality.        Visualized poste
rior fossa contents are grossly unremarkable.        Lung apices are clear.     
                    IMPRESSION:     PAGE  1                       Signed Report 
                  (CONTINUED)   Name: HARPER VENTURA
[place]                     : 1934 Age/S: 86  / M         [address]                Unit #: [id number]     Loc:               [place]  [place]  [address]              Phys: Laura Wilkinson MD                                      
         Acct: [id number]  Dis Date:               Status: REG ER             
                    PHONE #: [phone]     Exam Date: 2020  1642       
             FAX #: [phone]      Reason: dizzy, fall                       
                 EXAMS:                                               CPT CODE: 
    [id number] CT HEAD/BRAIN W/O CONT                     46471               <
Continued>                    Small acute subarachnoid hemorrhage along the 
right superior frontal         and precentral sulci.         No acute 
abnormalities of the cervical spine.         Mild generalized atrophy.         
Moderate chronic microvascular ischemic changes.                   Above 
findings discussed with Dr. Wilkinson at 5:01 PM on 20.                   
Location: [facility]          ** Electronically Signed by Maged Cornelius M.D. on 
2020 at 1702 **                      Reported and signed by: Maged Cornelius M.D.                            CC: Laura Wilkinson MD                        
                                                                                
          Technologist:Amna Thakur RT(R),CT;   CTDI:        DLP:        
Trnscb Date/Time: 2020 (170) [email or web address]                       Orig Print 
D/T: S: 2020 (170)      PAGE  2                       Signed Report      
                        PROTHROMBIN [id number] 17:01:00* 



             Test Item    Value        Reference Range Interpretation Comments

 

             PROTHROMBIN TIME PATIENT (test code = PTP) 12.8 seconds 9.0-14.0   

  N             

 

             INTERNATIONAL NORMAL RATIO (test code = INR) 1.1          0.8-1.2  

    N            The therapeutic range

for oral anticoagulant therapy formost indications is an international 
normalized ratio (INR)of between 2.0 and 3.0.  The recommended therapeutic 
INRrange for various clinical situations is listed 
below:_________________________________________________________Clinical 
Situation                          INR 
range_________________________________________________________ Pulmonary e
mbolism treatment              (2.0-3.0)Venous thrombosis treatmentVenous 
thrombosis prophylaxis (high risk surgery)Prevention of systemic embolism from: 
       Acute myocardial infarction         Valvular heart disease         Atrial
fibrillation Mechanical prosthetic heart valves          (2.5-3.5)





IS PATIENT ON ANTICOAGULANTS? NTHROMBOPLASTIN TIME [id number] 17:01:00* 



             Test Item    Value        Reference Range Interpretation Comments

 

             THROMBOPLASTIN TIME PARTIAL (test code = PTT) 35.5 seconds 23.0-37.

0    N             





IS PATIENT ON ANTICOAGULANTS? NCBC W/O [id number] 16:46:00* 



             Test Item    Value        Reference Range Interpretation Comments

 

             WHITE BLOOD CELL (test code = WBC) 9.4 K/mm3    4.5-12.5     N     

        

 

             RED BLOOD CELL (test code = RBC) 4.19 mill/mm3 4.0-5.8      N      

       

 

             HEMOGLOBIN (test code = HGB) 13.7 gram/dL 13.0-17.5    N           

  

 

             HEMATOCRIT (test code = HCT) 40.5 %       42.0-52.0    L           

  

 

             MEAN CELL VOLUME (test code = MCV) 96.7 fL      80-98        N     

        

 

             MEAN CELL HGB (test code = MCH) 32.7 picogram 27.0-33.0    N       

      

 

             MEAN CELL HGB CONCETRATION (test code = MCHC) 33.8 gram/dL 33.0-36.

0    N             

 

             RED CELL DISTRIBUTION WIDTH (test code = RDW) 12.6 %       11.6-16.

2    N             

 

             PLATELET COUNT (test code = PLT) 198 K/mm3    150-450      N       

      

 

             MEAN PLATELET VOLUME (test code = MPV) 9.9 fL       6.7-11.0     N 

            





- XR CHEST 1 -94-26 16:30:00 FAX:         Laura Wilkinson [phone]
   Gleason: B   St: REG----------
---------------------------------------------------------------------  Name:   HARPER FERNANDES                     [facility]                     : [birth year]  Age/S: 86/M           [address]                Unit #: [id number]    
 Loc: [place]  [address]              Phys: Laura Wilkinson MD 
                                              Acct: [id number] Dis Date:      
        Status: REG ER                                 PHONE #: [phone]    
Exam Date:     2020     1621                   FAX #: [phone]     
Reason: WEAKNESS                                           EXAMS:               
                               CPT CODE:      [id number] XR CHEST 1 V            
                  [address]                            REASON FOR EXAM: WEAKNESS    
          Exam Order Date: 2020 4:03 PM               Ordering M.D.: Laura Wilkinson MD               PROCEDURE:  - XR CHEST 1 V               COMPARISON:
Chest x-ray 9/15/2019               FINDINGS:                 Lines/Tubes: None 
             The lungs are clear.  There is no pleural effusion or pneumothorax.
      Pulmonary vascularity is within normal limits.               Cardio
mediastinal silhouette and mediastinal contours are unchanged       when account
ing for differences in technique.                Musculoskeletal structures and 
visualized portions of the upper       abdomen are also unchanged.              
          IMPRESSION:         No acute cardiopulmonary process.                 
 Location: [facility]          ** Electronically Signed by Maged Cornelius M.D. on 2020 at 1630 **                      Reported and signed by: Maged Cornelius M.D.   
       CC: Laura Wilkinson MD                                                 
                                                                  Technologist: 
Su Aguilar RT(R)                                   Trnscrd Date/Time/By: 0
2020 (163) : By: [email or web address]          [facility] Print D/T: S: 2020 (9363) 
                       PAGE  1                       Signed Report              
                AB [id number] 18:54:00* 



             Test Item    Value        Reference Range Interpretation Comments

 

             AB TREPONEMA (test code = TREPAB) Nonreactive Index NonReactive    

            





BASIC METABOLIC [id number] 18:40:00* 



             Test Item    Value        Reference Range Interpretation Comments

 

             SODIUM (test code = NA) 143 mmol/L   136-145      N             

 

             POTASSIUM (test code = K) 3.7 mmol/L   3.5-5.1      N             

 

             CHLORIDE (test code = CL) 109.0 mmol/L        H             

 

             CARBON DIOXIDE (test code = CO2) 25.0 mmol/L  21-32        N       

      

 

             ANION GAP (test code = GAP) 12.7         10-20        N            

 

 

             GLUCOSE (test code = GLU) 131 mg/dL           H             

 

             BLOOD UREA NITROGEN (test code = BUN) 18 mg/dL     7-18         N  

           

 

             GLOMERULAR FILTRATION RATE (test code = GFR) > 60 mL/min  >=60     

                 Estimated GFR by

using Modified MDRD formula.Chronic kidney disease is defined as either kidney 
damageor GFR <60 mL/min/1.73 m2 for >3 months.

 

             CREATININE (test code = CREAT) 0.80 mg/dL   0.7-1.3      N         

    

 

             BUN/CREATININE RATIO (test code = BUN/CREA) 22.5         10-20     

   H             

 

             CALCIUM (test code = CA) 8.4 mg/dL    8.5-10.1     L             





BASIC METABOLIC [id number] 18:38:00* 



             Test Item    Value        Reference Range Interpretation Comments

 

             SODIUM (test code = NA) 143 mmol/L   136-145      N             

 

             POTASSIUM (test code = K) 3.7 mmol/L   3.5-5.1      N             

 

             CHLORIDE (test code = CL) 109.0 mmol/L        H             

 

             CARBON DIOXIDE (test code = CO2)  mmol/L      21-32                

      

 

             ANION GAP (test code = GAP)              10-20                     

 

 

             GLUCOSE (test code = GLU)  mg/dL                            

 

             BLOOD UREA NITROGEN (test code = BUN)  mg/dL       7-18            

           

 

             GLOMERULAR FILTRATION RATE (test code = GFR)  mL/min      >=60     

                  

 

             CREATININE (test code = CREAT)  mg/dL       0.7-1.3                

    

 

             BUN/CREATININE RATIO (test code = BUN/CREA)              10-20     

                 

 

             CALCIUM (test code = CA) 8.4 mg/dL    8.5-10.1     L             





CBC W/O [id number] 18:22:00* 



             Test Item    Value        Reference Range Interpretation Comments

 

             WHITE BLOOD CELL (test code = WBC) 6.7 K/mm3    4.5-12.5     N     

        

 

             RED BLOOD CELL (test code = RBC) 4.47 mill/mm3 4.0-5.8      N      

       

 

             HEMOGLOBIN (test code = HGB) 14.4 gram/dL 13.0-17.5    N           

  

 

             HEMATOCRIT (test code = HCT) 42.5 %       42.0-52.0    N           

  

 

             MEAN CELL VOLUME (test code = MCV) 95.1 fL      80-98        N     

        

 

             MEAN CELL HGB (test code = MCH) 32.2 picogram 27.0-33.0    N       

      

 

             MEAN CELL HGB CONCETRATION (test code = MCHC) 33.9 gram/dL 33.0-36.

0    N             

 

             RED CELL DISTRIBUTION WIDTH (test code = RDW) 12.7 %       11.6-16.

2    N             

 

             PLATELET COUNT (test code = PLT) 189 K/mm3    150-450      N       

      

 

             MEAN PLATELET VOLUME (test code = MPV) 10.4 fL      6.7-11.0     N 

            





CBC W/O [id number] 18:19:00* 



             Test Item    Value        Reference Range Interpretation Comments

 

             WHITE BLOOD CELL (test code = WBC)  K/mm3       4.5-12.5           

        

 

             RED BLOOD CELL (test code = RBC)  mill/mm3    4.0-5.8              

      

 

             HEMOGLOBIN (test code = HGB) 14.4 gram/dL 13.0-17.5    N           

  

 

             HEMATOCRIT (test code = HCT) 42.5 %       42.0-52.0    N           

  

 

             MEAN CELL VOLUME (test code = MCV)  fL          80-98              

        

 

             MEAN CELL HGB (test code = MCH)  picogram    27.0-33.0             

     

 

             MEAN CELL HGB CONCETRATION (test code = MCHC)  gram/dL     33.0-36.

0                  

 

             RED CELL DISTRIBUTION WIDTH (test code = RDW)  %           11.6-16.

2                  

 

             PLATELET COUNT (test code = PLT)  K/mm3       150-450              

      

 

             MEAN PLATELET VOLUME (test code = MPV)  fL          6.7-11.0       

            





URINALYSIS [id number] 12:05:00* 



             Test Item    Value        Reference Range Interpretation Comments

 

             UA COLOR (test code = COLU) Light-Yellow YELLOW                    

 

 

             UA APPEARANCE (test code = APPU) CLEAR        CLEAR                

      

 

             UA GLUCOSE DIPSTICK (test code = DGLUU) NEGATIVE mg/dL NEGATIVE    

               

 

             UA BILIRUBIN DIPSTICK (test code = BILU) NEGATIVE mg/dL NEGATIVE   

                

 

             UA KETONE DIPSTICK (test code = KETU) NEGATIVE mg/dL NEGATIVE      

             

 

             UA SPECIFIC GRAVITY (test code = SGU) 1.011        1.001-1.035     

           

 

             UA BLOOD DIPSTICK (test code = JANE) Negative mg/dL NEGATIVE        

           

 

             UA PH DIPSTICK (test code = NOEMÍ) 6.0          5.0-8.0              

      

 

             UA PROTEIN DIPSTICK (test code = PROU) NEGATIVE mg/dL NEGATIVE     

              

 

             UA UROBILINIOGEN DIPSTICK (test code = URO) Normal mg/dL NEGATIVE  

                 

 

             UA NITRITE DIPSTICK (test code = CAROL) NEGATIVE     NEGATIVE       

            

 

             UA LEUKOCYTE ESTERASE W REFLEX (test code = LEUUR) NEGATIVE Vick/uL 

NEGATIVE                   

 

             UA WBC (test code = WBCU) 0-5 per HPF  0-5                        

 

             UA RBC (test code = RBCU) 0-2 #/HPF    0-5                        

 

             UA EPITHELIAL CELLS (test code = EPIU) None seen per HPF FEW       

                 

 

             UA BACTERIA (test code = BACU) NONE SEEN #/HPF NONE                

       

 

             UA MUCUS (test code = MUCU) FEW #/LPF    FEW                       

 





COMMENTS TO PHLEBOTOMIST: OK TO STRAIGHT CATH FOR SAMPLE IF                     
     NEEDEDUrine Source? Clean CatchURINALYSIS [id number] 12:03:00* 



             Test Item    Value        Reference Range Interpretation Comments

 

             UA COLOR (test code = COLU) Light-Yellow YELLOW                    

 

 

             UA APPEARANCE (test code = APPU) CLEAR        CLEAR                

      

 

             UA GLUCOSE DIPSTICK (test code = DGLUU) NEGATIVE mg/dL NEGATIVE    

               

 

             UA BILIRUBIN DIPSTICK (test code = BILU) NEGATIVE mg/dL NEGATIVE   

                

 

             UA KETONE DIPSTICK (test code = KETU) NEGATIVE mg/dL NEGATIVE      

             

 

             UA SPECIFIC GRAVITY (test code = SGU) 1.011        1.001-1.035     

           

 

             UA BLOOD DIPSTICK (test code = JANE) Negative mg/dL NEGATIVE        

           

 

             UA PH DIPSTICK (test code = NOEMÍ) 6.0          5.0-8.0              

      

 

             UA PROTEIN DIPSTICK (test code = PROU) NEGATIVE mg/dL NEGATIVE     

              

 

             UA UROBILINIOGEN DIPSTICK (test code = URO) Normal mg/dL NEGATIVE  

                 

 

             UA NITRITE DIPSTICK (test code = CAROL) NEGATIVE     NEGATIVE       

            

 

             UA LEUKOCYTE ESTERASE W REFLEX (test code = LEUUR) NEGATIVE Vcik/uL 

NEGATIVE                   

 

             UA WBC (test code = WBCU)  per HPF     0-5                        

 

             UA RBC (test code = RBCU)  per HPF     0-5                        

 

             UA EPITHELIAL CELLS (test code = EPIU)  per HPF     Few            

            

 

             UA BACTERIA (test code = BACU)  per HPF     NONE                   

    





COMMENTS TO PHLEBOTOMIST: OK TO STRAIGHT CATH FOR SAMPLE IF                     
     NEEDEDUrine Source? Clean Catch- MRI BRAIN W/O [id number] 10:34:00 
FAX:         Jaswinder Chapman [phone]    Gleason: B   St: ADM FAX: Tang Aguilarh  [phone]   ----------------------------------------
---------------------------------------  Name:   HARPER VENTURA                   
 [facility]                     : 1934  Age/S: 85/M           [address]                Unit #: [id number]      Loc: [place]       [place],  
[place]  [address]              Phys: Jaswinder Chapman MD                              
                Acct: [id number] Dis Date:               Status: ADM IN       
                         PHONE #: [phone]     Exam Date:     2020    
1015                   FAX #: [phone]     Reason: dizziness                
                         EXAMS:                                               
CPT CODE:      [id number] MRI BRAIN W/O CONTRAST                     [address]       
            HISTORY: Dizziness.               COMPARISON: Head CT from 2020.               Location: [facility].               MRI brain without contrast:    
          No acute territorial vascular or acute lacunar infarction. No MR      
evidence for hemorrhage is noted. Amyloid angiopathy noted. No       extra-axial
fluid collections. Chronic white matter ischemic disease       in the per
iventricular distribution and scattered lesions in the       centrum semiovale w
leonardo matter and in the cortical and the subcortical       deep white matter. No 
herniation, hydrocephalus or midline shift.       Fourth ventricle is midline.  
            Expected flow-voids noted within the major intracranial vasculature.
      VII and VIII nerve complex are symmetrical and normal. Mastoid air       
cells are clear. Mucosal thickening of the ethmoid air cells.       Intraorbital
contents are unremarkable.               Midbrain, marco and medulla are without 
mass effect. No cerebellar       ectopia. Pituitary gland, optic chiasm and co
rpus callosum are normal       with marked atrophy of the corpus callosum along 
with global cortical       atrophy. Clivus demonstrating normal marrow signal. S
ymmetrical       hippocampi. No mesial temporal sclerosis. No parenchymal mass o
n this       noncontrast study.                 IMPRESSION:                   No
acute territorial vascular or acute lacunar infarct. Chronic white         jazmine
er ischemic disease.          ** Electronically Signed by MARTI Leo on 0
2020 at 1034 **                      Reported and signed by: Raymond Leo M.D.       CC: Jaswinder Chapman MD; Tang Lei                       
                                                                      Technolog
ist: KATELYNN CARROLLRT - MRI                                 Trnscrd Date/Time/By
: 2020 (1034) : By: [email or web address]           Orig Print D/T: S: 2020 (103
7)                         PAGE  1                       Signed Report          
                    BASIC METABOLIC [id number] 06:43:00* 



             Test Item    Value        Reference Range Interpretation Comments

 

             SODIUM (test code = NA) 141 mmol/L   136-145      N             

 

             POTASSIUM (test code = K) 3.6 mmol/L   3.5-5.1      N             

 

             CHLORIDE (test code = CL) 109.0 mmol/L        H             

 

             CARBON DIOXIDE (test code = CO2) 25.0 mmol/L  21-32        N       

      

 

             ANION GAP (test code = GAP) 10.6         10-20        N            

 

 

             GLUCOSE (test code = GLU) 118 mg/dL           H             

 

             BLOOD UREA NITROGEN (test code = BUN) 14 mg/dL     7-18         N  

           

 

             GLOMERULAR FILTRATION RATE (test code = GFR) > 60 mL/min  >=60     

                 Estimated GFR by

using Modified MDRD formula.Chronic kidney disease is defined as either kidney 
damageor GFR <60 mL/min/1.73 m2 for >3 months.

 

             CREATININE (test code = CREAT) 0.80 mg/dL   0.7-1.3      N         

    

 

             BUN/CREATININE RATIO (test code = BUN/CREA) 17.5         10-20     

   N             

 

             CALCIUM (test code = CA) 8.3 mg/dL    8.5-10.1     L             





[id number] 06:43:00* 



             Test Item    Value        Reference Range Interpretation Comments

 

             PHOSPHORUS (test code = PHOS) 2.6 mg/dL    2.5-4.9      N          

   





URIC [id number] 06:43:00* 



             Test Item    Value        Reference Range Interpretation Comments

 

             URIC ACID (test code = URIC) 5.1 mg/dL    2.6-7.2      N           

  





[id number] 06:43:00* 



             Test Item    Value        Reference Range Interpretation Comments

 

             MAGNESIUM (test code = MAG) 2.6 mg/dL    1.8-2.4      H            

 





VITAMIN [id number] 06:43:00* 



             Test Item    Value        Reference Range Interpretation Comments

 

             VITAMIN B12 (test code = VITB12) 243 pg/mL    193-986      N       

      





FOLIC [id number] 06:43:00* 



             Test Item    Value        Reference Range Interpretation Comments

 

             FOLIC ACID (test code = FOL) 17.0 ng/mL   3.10-17.50   N           

  





THYROID PROFILE [id number] 06:43:00* 



             Test Item    Value        Reference Range Interpretation Comments

 

             T3 UPTAKE (test code = T3UP) 38.0 %       30.0-40.0    N           

  

 

             T4 (THYROXINE) (test code = T4) 8.6 ug/dL    4.5-13.9     N        

     

 

             T7 (FREE THYROXINE INDEX) (test code = T7) 3.26 FTI     1.3-5.1    

  N             

 

             THYROID STIMULATING HORMONE (test code = TSH) 1.080 uIU/mL 0.36-3.7

4    N            TSH 

REFERENCE RANGES:  EUTHYROID:     0.35 - 4.3 mIU/mL                       HYPO  
  :     > 5.5      mIU/mL                       HYPER    :     < 0.35     mIU/mL





[id number] 06:11:00* 



             Test Item    Value        Reference Range Interpretation Comments

 

             AMMONIA (test code = AMM) 15 umol/L    11-32        N             





BASIC METABOLIC [id number] 06:03:00* 



             Test Item    Value        Reference Range Interpretation Comments

 

             SODIUM (test code = NA) 141 mmol/L   136-145      N             

 

             POTASSIUM (test code = K) 3.6 mmol/L   3.5-5.1      N             

 

             CHLORIDE (test code = CL) 109.0 mmol/L        H             

 

             CARBON DIOXIDE (test code = CO2)  mmol/L      21-32                

      

 

             ANION GAP (test code = GAP)              10-20                     

 

 

             GLUCOSE (test code = GLU)  mg/dL                            

 

             BLOOD UREA NITROGEN (test code = BUN)  mg/dL       7-18            

           

 

             GLOMERULAR FILTRATION RATE (test code = GFR)  mL/min      >=60     

                  

 

             CREATININE (test code = CREAT)  mg/dL       0.7-1.3                

    

 

             BUN/CREATININE RATIO (test code = BUN/CREA)              10-20     

                 

 

             CALCIUM (test code = CA)  mg/dL       8.5-10.1                   





[id number] 06:03:00* 



             Test Item    Value        Reference Range Interpretation Comments

 

             PHOSPHORUS (test code = PHOS)  mg/dL       2.5-4.9                 

   





URIC [id number] 06:03:00* 



             Test Item    Value        Reference Range Interpretation Comments

 

             URIC ACID (test code = URIC)  mg/dL       2.6-7.2                  

  





[id number] 06:03:00* 



             Test Item    Value        Reference Range Interpretation Comments

 

             MAGNESIUM (test code = MAG)  mg/dL       1.8-2.4                   

 





VITAMIN [id number] 06:03:00* 



             Test Item    Value        Reference Range Interpretation Comments

 

             VITAMIN B12 (test code = VITB12)  pg/mL       193-986              

      





FOLIC [id number] 06:03:00* 



             Test Item    Value        Reference Range Interpretation Comments

 

             FOLIC ACID (test code = FOL)  ng/mL       3.10-17.50               

  





THYROID PROFILE [id number] 06:03:00* 



             Test Item    Value        Reference Range Interpretation Comments

 

             T3 UPTAKE (test code = T3UP)  %           30.0-40.0                

  

 

             T4 (THYROXINE) (test code = T4)  ug/dL       4.5-13.9              

     

 

             T7 (FREE THYROXINE INDEX) (test code = T7)  FTI         1.3-5.1    

                

 

             THYROID STIMULATING HORMONE (test code = TSH)  uIU/mL      0.36-3.7

4                  





CBC W/AUTO [id number] 05:48:00* 



             Test Item    Value        Reference Range Interpretation Comments

 

             WHITE BLOOD CELL (test code = WBC) 10.2 K/mm3   4.5-12.5     N     

        

 

             RED BLOOD CELL (test code = RBC) 4.73 mill/mm3 4.0-5.8      N      

       

 

             HEMOGLOBIN (test code = HGB) 15.1 gram/dL 13.0-17.5    N           

  

 

             HEMATOCRIT (test code = HCT) 44.9 %       42.0-52.0    N           

  

 

             MEAN CELL VOLUME (test code = MCV) 94.9 fL      80-98        N     

        

 

             MEAN CELL HGB (test code = MCH) 31.9 picogram 27.0-33.0    N       

      

 

             MEAN CELL HGB CONCETRATION (test code = MCHC) 33.6 gram/dL 33.0-36.

0    N             

 

             RED CELL DISTRIBUTION WIDTH (test code = RDW) 12.7 %       11.6-16.

2    N             

 

             RED CELL DISTRIBUTION WIDTH SD (test code = RDW-SD) 44.1 fL      37

.0-51.0    N             

 

             PLATELET COUNT (test code = PLT) 187 K/mm3    150-450      N       

      

 

             MEAN PLATELET VOLUME (test code = MPV) 10.5 fL      6.7-11.0     N 

            

 

             NEUTROPHIL % (test code = NT%) 73.5 %       39.0-69.0    H         

    

 

             IMMATURE GRANULOCYTE % (test code = IG%) 0.2 %        0.0-5.0      

N             

 

             LYMPHOCYTE % (test code = LY%) 15.3 %       25.0-55.0    L         

    

 

             MONOCYTE % (test code = MO%) 10.1 %       0.0-10.0     H           

  

 

             EOSINOPHIL % (test code = EO%) 0.4 %        0.0-5.0      N         

    

 

             BASOPHIL % (test code = BA%) 0.5 %        0.0-1.0      N           

  

 

             NUCLEATED RBC % (test code = NRBC%) 0.0 %        0-0          N    

         

 

             NEUTROPHIL # (test code = NT#) 7.50 K/mm3   1.8-7.7      N         

    

 

             IMMATURE GRANULOCYTE # (test code = IG#) 0.02 x10 3/uL 0-0.03      

 N             

 

             LYMPHOCYTE # (test code = LY#) 1.56 K/mm3   1.0-5.0      N         

    

 

             MONOCYTE # (test code = MO#) 1.03 K/mm3   0-0.8        H           

  

 

             EOSINOPHIL # (test code = EO#) 0.04 K/mm3   0.0-0.5      N         

    

 

             BASOPHIL # (test code = BA#) 0.05 K/mm3   0.0-0.2      N           

  

 

             NUCLEATED RBC # (test code = NRBC#) 0.00 K/mm3   0.0-0.1      N    

         

 

             MANUAL DIFF REQUIRED (test code = MDIFF) NO                        

              





[id number] 02:57:00* 



             Test Item    Value        Reference Range Interpretation Comments

 

             TROPONIN-I (test code = TROPI) 0.079 ng/mL  0-0.045      HH        

   Results called to [id number] 

by V.LAB.GP 20 0257Critical results verified and read back by Nurse? Y





COMMENTS TO PHLEBOTOMIST: COLLECT 3 HOURS AFTER PREVIOUS                        
  [id number] 21:21:00* 



             Test Item    Value        Reference Range Interpretation Comments

 

             TROPONIN-I (test code = TROPI) 0.066 ng/mL  0-0.045      HH        

   Results called to [id number] 

by V.LAB.QUR 20 2121Critical results verified and read back by Nurse? Y





COMMENTS TO PHLEBOTOMIST: COLLECT 3 HOURS AFTER PREVIOUS                        
  SAMPLE- CT HEAD/BRAIN W/O [id number] 14:15:00  Name: HARPER VENTURA         
            [facility]                     : 1934 Age/S: 85  / M   
     [address]                Unit #: [id number]     Loc:               
[place]  [address]              Phys: Jaswinder Chapman MD                   
                           Acct: [id number]  Dis Date:               Status: 
REG ER                                  PHONE #: [phone]     Exam Date: 
2020  1403                     FAX #: [phone]      Reason: altered, 
syncope in ed                              EXAMS:                               
               CPT CODE:      [id number] CT HEAD/BRAIN W/O CONT                  
  [address]                    HISTORY:  altered, syncope in ed               
TECHNIQUE: Noncontrast 2.5 mm axial CT of the head. Examination       acquired 
within 24 hours of arrival. Automated exposure control for       dose reduction;
DLP:  870 mGy-cm.               COMPARISON: Same date, 2.5 hours earlier        
      FINDINGS:               No acute hemorrhage. No CT evidence of acute 
infarct. Mild       periventricular chronic microvascular ischemic changes. No  
    intracranial mass or mass effect. Moderate parenchymal atrophy. No       
hydrocephalus. No extra-axial fluid collection. Atherosclerotic       vascular 
calcification of the carotid siphons.               Trace bilateral maxillary 
sinus mucosal thickening. Mastoid air cells       and middle ear cavities are 
clear. Bilateral lens implants. Calvarium       and skull base are intact.      
                  IMPRESSION:                   No acute intracranial process.  
                                    LOCATION: LP                                
        ** Electronically Signed by Celestina Pino D.O. on 2020 at 1415 **     
                Reported and signed by: Celestina Pino D.O.        CC: 
Jaswinder Chapman MD                                                           
                                                       Technologist:Sebastian Bear RT(R)(CT)     CTDI:        DLP:        Trnscb Date/Time: 2020 (1415)
t.SDR.LDP1                       Orig Print D/T: S: 2020 (1418)      PAGE 
1                       Signed Report                               GLUBED
2020 13:51:00* 



             Test Item    Value        Reference Range Interpretation Comments

 

             GLUBED (test code = GLUBED) 106 mg/dL           N            

Performed by certified  

at [facility]





BASIC METABOLIC [id number] 13:30:00* 



             Test Item    Value        Reference Range Interpretation Comments

 

             SODIUM (test code = NA) 140 mmol/L   136-145      N             

 

             POTASSIUM (test code = K) 4.3 mmol/L   3.5-5.1      N             

 

             CHLORIDE (test code = CL) 104.9 mmol/L        N             

 

             CARBON DIOXIDE (test code = CO2) 30.0 mmol/L  21-32        N       

      

 

             ANION GAP (test code = GAP) 9.4          10-20        L            

 

 

             GLUCOSE (test code = GLU) 55 mg/dL            L             

 

             BLOOD UREA NITROGEN (test code = BUN) 12 mg/dL     7-18         N  

           

 

             GLOMERULAR FILTRATION RATE (test code = GFR) > 60 mL/min  >=60     

                 Estimated GFR by

using Modified MDRD formula.Chronic kidney disease is defined as either kidney 
damageor GFR <60 mL/min/1.73 m2 for >3 months.

 

             CREATININE (test code = CREAT) 0.20 mg/dL   0.7-1.3      L         

    

 

             BUN/CREATININE RATIO (test code = BUN/CREA) 60.0         10-20     

   H             

 

             CALCIUM (test code = CA) 8.6 mg/dL    8.5-10.1     N             





[id number] 13:30:00* 



             Test Item    Value        Reference Range Interpretation Comments

 

             TROPONIN-I (test code = TROPI) 0.022 ng/mL  0-0.045      N         

    





CBC W/O [id number] 13:02:00* 



             Test Item    Value        Reference Range Interpretation Comments

 

             WHITE BLOOD CELL (test code = WBC) 6.3 K/mm3    4.5-12.5     N     

        

 

             RED BLOOD CELL (test code = RBC) 4.30 mill/mm3 4.0-5.8      N      

       

 

             HEMOGLOBIN (test code = HGB) 13.8 gram/dL 13.0-17.5    N           

  

 

             HEMATOCRIT (test code = HCT) 41.3 %       42.0-52.0    L           

  

 

             MEAN CELL VOLUME (test code = MCV) 96.0 fL      80-98        N     

        

 

             MEAN CELL HGB (test code = MCH) 32.1 picogram 27.0-33.0    N       

      

 

             MEAN CELL HGB CONCETRATION (test code = MCHC) 33.4 gram/dL 33.0-36.

0    N             

 

             RED CELL DISTRIBUTION WIDTH (test code = RDW) 12.4 %       11.6-16.

2    N             

 

             PLATELET COUNT (test code = PLT) 188 K/mm3    150-450      N       

      

 

             MEAN PLATELET VOLUME (test code = MPV) 11.1 fL      6.7-11.0     H 

            





CBC W/O [id number] 12:59:00* 



             Test Item    Value        Reference Range Interpretation Comments

 

             WHITE BLOOD CELL (test code = WBC)  K/mm3       4.5-12.5           

        

 

             RED BLOOD CELL (test code = RBC)  mill/mm3    4.0-5.8              

      

 

             HEMOGLOBIN (test code = HGB) 13.8 gram/dL 13.0-17.5    N           

  

 

             HEMATOCRIT (test code = HCT)  %           42.0-52.0                

  

 

             MEAN CELL VOLUME (test code = MCV)  fL          80-98              

        

 

             MEAN CELL HGB (test code = MCH)  picogram    27.0-33.0             

     

 

             MEAN CELL HGB CONCETRATION (test code = MCHC)  gram/dL     33.0-36.

0                  

 

             RED CELL DISTRIBUTION WIDTH (test code = RDW)  %           11.6-16.

2                  

 

             PLATELET COUNT (test code = PLT)  K/mm3       150-450              

      

 

             MEAN PLATELET VOLUME (test code = MPV)  fL          6.7-11.0       

            





- CT C-SPINE W/O [id number] 11:58:00  Name: HARPER VENTURA                
     [facility]                     : 1934 Age/S: 85  / M         
[address] [facility]                Unit #: [id number]     Loc:               
[place]  [address]              Phys: Jaswinder Chapman MD                   
                           Acct: [id number]  Dis Date:               Status: 
PRE ER                                  PHONE #: [phone]     Exam Date: 
2020  1140                     FAX #: [phone]      Reason: pain      
                                         EXAMS:                                 
             CPT CODE:      [id number] CT C-SPINE W/O CONTRAST                   
[address]                    HISTORY:  pain, head trauma               TECHNIQUE: 
2.5 mm axial CT of the cervical spine. Sagittal and coronal       reformatted 
images were generated. Automated exposure control for dose       reduction.     
         COMPARISON:  None               FINDINGS:                Craniocervical
and cervicothoracic articulations are appropriate.       Degenerative changes of
the anterior atlantoaxial joint. Cervical       levoscoliosis and reversal of 
the lordosis. Grade 1 retrolisthesis at       C4-C5. Vertebral body heights are 
preserved. Severe disc space loss       with disc bulge, marginal osteophytes, 
and uncovertebral arthrosis       from C3-C4 through C6-C7. Mild disc space loss
with marginal       osteophytes at C2-C3 and C7-T1. Mild cervical facet 
arthrosis. 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
C4 through       C6-C7. No prevertebral or paraspinal soft tissue abnormality.  
    Atherosclerotic vascular calcification of the carotid arteries and       
aortic arch. Lung apices are clear.                          IMPRESSION:        
          No acute fracture or subluxation of the cervical spine.               
                       LOCATION: LP                               ** 
Electronically Signed by Celestina Pino D.O. on 2020 at 1158 **                
     Reported and signed by: Celestina Pino D.O.       CC: Jaswinder Chapman MD     
                                                                                
                            Technologist:Sebastian Bear RT(R)(CT)     CTDI:  
     DLP:        Trnscb Date/Time: 2020 (1158) t.SDR.LDP1                 
     Orig Print D/T: S: 2020 (3778)      PAGE  1                       
Signed Report                               - CT HEAD/BRAIN W/O [id number] 
11:55:00  Name: HARPER VENTURA                      [facility]                
    : 1934 Age/S: 85  / M         [address] [facility]                Unit 
#: [id number]     Loc:               [place]  [place]  [address]              Phys: 
Jaswinder Chapman MD                                               Acct: 
[id number]  Dis Date:               Status: PRE ER                            
     PHONE #: [phone]     Exam Date: 2020  1140                     
FAX #: [phone]      Reason: dizziness, hit head, HTN urgency               
    EXAMS:                                               CPT CODE:      
[id number] CT HEAD/BRAIN W/O CONT                     [address]                    
HISTORY:  dizziness, hit head, HTN urgency               TECHNIQUE: Noncontrast 
2.5 mm axial CT of the head. Examination       acquired within 24 hours of 
arrival. Automated exposure control for       dose reduction; DLP:  870 mGy-cm. 
             COMPARISON: 9/15/19               FINDINGS:               No acute 
hemorrhage. No CT evidence of acute infarct. Mild       periventricular chronic 
microvascular ischemic changes. No       intracranial mass or mass effect. 
Moderate parenchymal atrophy. No       hydrocephalus. No extra-axial fluid 
collection. Atherosclerotic       vascular calcification of the carotid siphons.
              Trace bilateral maxillary sinus mucosal thickening. Mastoid air 
cells       and middle ear cavities are clear. Bilateral lens implants. 
Calvarium       and skull base are intact.                         IMPRESSION:  
                No acute intracranial process.                                  
    LOCATION: LP                               ** Electronically Signed by Celestina Pino D.O. on 2020 at 1155 **                      Reported and signed by: 
Celestina Pino D.O.         CC: Jaswinder Chapman MD                                
                                                                                
 Technologist:Sebastian Bear RT(R)(CT)     CTDI:        DLP:        Trnscb 
Date/Time: 2020 (3945) [id number]                       Orig Print D/T: S:
2020 (5853)      PAGE  1                       Signed Report              
                COMPREHENSIVE METABOLIC [id number] 05:43:00* 



             Test Item    Value        Reference Range Interpretation Comments

 

             SODIUM (test code = NA) 131 mmol/L   136-145      L             

 

             POTASSIUM (test code = K) 4.3 mmol/L   3.5-5.1      N             

 

             CHLORIDE (test code = CL) 96.0 mmol/L         L             

 

             CARBON DIOXIDE (test code = CO2) 29.0 mmol/L  21-32        N       

      

 

             ANION GAP (test code = GAP) 10.3         10-20        N            

 

 

             GLUCOSE (test code = GLU) 100 mg/dL           N             

 

             BLOOD UREA NITROGEN (test code = BUN) 24 mg/dL     7-18         H  

           

 

             GLOMERULAR FILTRATION RATE (test code = GFR) 58 mL/min    >=60     

                 Estimated GFR by 

using Modified MDRD formula.Chronic kidney disease is defined as either kidney 
damageor GFR <60 mL/min/1.73 m2 for >3 months.

 

             CREATININE (test code = CREAT) 1.20 mg/dL   0.7-1.3      N         

    

 

             BUN/CREATININE RATIO (test code = BUN/CREA) 20.7         10-20     

   H             

 

             TOTAL PROTEIN (test code = PROT) 6.9 gram/dL  6.4-8.2      N       

      

 

             ALBUMIN (test code = ALB) 3.6 g/dL     3.4-5.0      N             

 

             GLOBULIN (test code = GLOB) 3.3 gram/dL  2.7-4.2      N            

 

 

             ALBUMIN/GLOBULIN RATIO (test code = A/G) 1.1          0.75-1.50    

N             

 

             CALCIUM (test code = CA) 8.8 mg/dL    8.5-10.1     N             

 

             BILIRUBIN TOTAL (test code = BILT) 0.60 mg/dL   0.0-1.0      N     

        

 

             SGOT/AST (test code = AST) 15 IUnit/L   15-37        N             

 

             SGPT/ALT (test code = ALT) 24 IUnit/L   12-78        N             

 

             ALKALINE PHOSPHATASE TOTAL (test code = ALKP) 68 IUnit/L     

     N            **Note change 

in reference range due to change in reagent.**





COMPREHENSIVE METABOLIC [id number] 05:33:00* 



             Test Item    Value        Reference Range Interpretation Comments

 

             SODIUM (test code = NA) 131 mmol/L   136-145      L             

 

             POTASSIUM (test code = K) 4.3 mmol/L   3.5-5.1      N             

 

             CHLORIDE (test code = CL) 96.0 mmol/L         L             

 

             CARBON DIOXIDE (test code = CO2)  mmol/L      21-32                

      

 

             ANION GAP (test code = GAP)              10-20                     

 

 

             GLUCOSE (test code = GLU)  mg/dL                            

 

             BLOOD UREA NITROGEN (test code = BUN)  mg/dL       7-18            

           

 

             GLOMERULAR FILTRATION RATE (test code = GFR)  mL/min      >=60     

                  

 

             CREATININE (test code = CREAT)  mg/dL       0.7-1.3                

    

 

             BUN/CREATININE RATIO (test code = BUN/CREA)              10-20     

                 

 

             TOTAL PROTEIN (test code = PROT)  gram/dL     6.4-8.2              

      

 

             ALBUMIN (test code = ALB)  g/dL        3.4-5.0                    

 

             GLOBULIN (test code = GLOB)  gram/dL     2.7-4.2                   

 

 

             ALBUMIN/GLOBULIN RATIO (test code = A/G)              0.75-1.50    

              

 

             CALCIUM (test code = CA)  mg/dL       8.5-10.1                   

 

             BILIRUBIN TOTAL (test code = BILT)  mg/dL       0.0-1.0            

        

 

             SGOT/AST (test code = AST)  IUnit/L     15-37                      

 

             SGPT/ALT (test code = ALT)  IUnit/L     12-78                      

 

             ALKALINE PHOSPHATASE TOTAL (test code = ALKP)  IUnit/L       

                   





CBC W/AUTO [id number] 05:19:00* 



             Test Item    Value        Reference Range Interpretation Comments

 

             WHITE BLOOD CELL (test code = WBC) 6.0 K/mm3    4.5-12.5     N     

        

 

             RED BLOOD CELL (test code = RBC) 4.92 mill/mm3 4.0-5.8      N      

       

 

             HEMOGLOBIN (test code = HGB) 15.9 gram/dL 13.0-17.5    N           

  

 

             HEMATOCRIT (test code = HCT) 45.5 %       42.0-52.0    N           

  

 

             MEAN CELL VOLUME (test code = MCV) 92.5 fL      80-98        N     

        

 

             MEAN CELL HGB (test code = MCH) 32.3 picogram 27.0-33.0    N       

      

 

             MEAN CELL HGB CONCETRATION (test code = MCHC) 34.9 gram/dL 33.0-36.

0    N             

 

             RED CELL DISTRIBUTION WIDTH (test code = RDW) 12.0 %       11.6-16.

2    N             

 

             RED CELL DISTRIBUTION WIDTH SD (test code = RDW-SD) 41.3 fL      37

.0-51.0    N             

 

             PLATELET COUNT (test code = PLT) 199 K/mm3    150-450      N       

      

 

             MEAN PLATELET VOLUME (test code = MPV) 10.7 fL      6.7-11.0     N 

            

 

             NEUTROPHIL % (test code = NT%) 61.2 %       39.0-69.0    N         

    

 

             IMMATURE GRANULOCYTE % (test code = IG%) 0.3 %        0.0-5.0      

N             

 

             LYMPHOCYTE % (test code = LY%) 25.2 %       25.0-55.0    N         

    

 

             MONOCYTE % (test code = MO%) 11.6 %       0.0-10.0     H           

  

 

             EOSINOPHIL % (test code = EO%) 1.2 %        0.0-5.0      N         

    

 

             BASOPHIL % (test code = BA%) 0.5 %        0.0-1.0      N           

  

 

             NUCLEATED RBC % (test code = NRBC%) 0.0 %        0-0          N    

         

 

             NEUTROPHIL # (test code = NT#) 3.69 K/mm3   1.8-7.7      N         

    

 

             IMMATURE GRANULOCYTE # (test code = IG#) 0.02 x10 3/uL 0-0.03      

 N             

 

             LYMPHOCYTE # (test code = LY#) 1.52 K/mm3   1.0-5.0      N         

    

 

             MONOCYTE # (test code = MO#) 0.70 K/mm3   0-0.8        N           

  

 

             EOSINOPHIL # (test code = EO#) 0.07 K/mm3   0.0-0.5      N         

    

 

             BASOPHIL # (test code = BA#) 0.03 K/mm3   0.0-0.2      N           

  

 

             NUCLEATED RBC # (test code = NRBC#) 0.00 K/mm3   0.0-0.1      N    

         





CBC W/AUTO [id number] 05:15:00* 



             Test Item    Value        Reference Range Interpretation Comments

 

             WHITE BLOOD CELL (test code = WBC)  K/mm3       4.5-12.5           

        

 

             RED BLOOD CELL (test code = RBC)  mill/mm3    4.0-5.8              

      

 

             HEMOGLOBIN (test code = HGB) 15.9 gram/dL 13.0-17.5    N           

  

 

             HEMATOCRIT (test code = HCT) 45.5 %       42.0-52.0    N           

  

 

             MEAN CELL VOLUME (test code = MCV)  fL          80-98              

        

 

             MEAN CELL HGB (test code = MCH)  picogram    27.0-33.0             

     

 

             MEAN CELL HGB CONCETRATION (test code = MCHC)  gram/dL     33.0-36.

0                  

 

             RED CELL DISTRIBUTION WIDTH (test code = RDW)  %           11.6-16.

2                  

 

             RED CELL DISTRIBUTION WIDTH SD (test code = RDW-SD)  fL          37

.0-51.0                  

 

             PLATELET COUNT (test code = PLT)  K/mm3       150-450              

      

 

             MEAN PLATELET VOLUME (test code = MPV)  fL          6.7-11.0       

            

 

             NEUTROPHIL % (test code = NT%)  %           39.0-69.0              

    

 

             IMMATURE GRANULOCYTE % (test code = IG%)  %           0.0-5.0      

              

 

             LYMPHOCYTE % (test code = LY%)  %           25.0-55.0              

    

 

             MONOCYTE % (test code = MO%)  %           0.0-10.0                 

  

 

             EOSINOPHIL % (test code = EO%)  %           0.0-5.0                

    

 

             BASOPHIL % (test code = BA%)  %           0.0-1.0                  

  

 

             NEUTROPHIL # (test code = NT#)  K/mm3       1.8-7.7                

    

 

             LYMPHOCYTE # (test code = LY#)  K/mm3       1.0-5.0                

    

 

             MONOCYTE # (test code = MO#)  K/mm3       0-0.8                    

  

 

             EOSINOPHIL # (test code = EO#)  K/mm3       0.0-0.5                

    

 

             BASOPHIL # (test code = BA#)  K/mm3       0.0-0.2                  

  





CBC W/O [id number] 05:15:00* 



             Test Item    Value        Reference Range Interpretation Comments

 

             WHITE BLOOD CELL (test code = WBC) 6.4 K/mm3    4.5-12.5     N     

        

 

             RED BLOOD CELL (test code = RBC) 4.70 mill/mm3 4.0-5.8      N      

       

 

             HEMOGLOBIN (test code = HGB) 15.1 gram/dL 13.0-17.5    N           

  

 

             HEMATOCRIT (test code = HCT) 43.8 %       42.0-52.0    N           

  

 

             MEAN CELL VOLUME (test code = MCV) 93.2 fL      80-98        N     

        

 

             MEAN CELL HGB (test code = MCH) 32.1 picogram 27.0-33.0    N       

      

 

             MEAN CELL HGB CONCETRATION (test code = MCHC) 34.5 gram/dL 33.0-36.

0    N             

 

             RED CELL DISTRIBUTION WIDTH (test code = RDW) 12.0 %       11.6-16.

2    N             

 

             PLATELET COUNT (test code = PLT) 179 K/mm3    150-450      N       

      

 

             MEAN PLATELET VOLUME (test code = MPV) 10.9 fL      6.7-11.0     N 

            





[id number] 20:47:00* 



             Test Item    Value        Reference Range Interpretation Comments

 

             AMMONIA (test code = AMM) 25 umol/L    11-32        N             





CBC W/O [id number] 05:00:00* 



             Test Item    Value        Reference Range Interpretation Comments

 

             WHITE BLOOD CELL (test code = WBC) 7.8 K/mm3    4.5-12.5     N     

        

 

             RED BLOOD CELL (test code = RBC) 4.69 mill/mm3 4.0-5.8      N      

       

 

             HEMOGLOBIN (test code = HGB) 15.3 gram/dL 13.0-17.5    N           

  

 

             HEMATOCRIT (test code = HCT) 44.2 %       42.0-52.0    N           

  

 

             MEAN CELL VOLUME (test code = MCV) 94.2 fL      80-98        N     

        

 

             MEAN CELL HGB (test code = MCH) 32.6 picogram 27.0-33.0    N       

      

 

             MEAN CELL HGB CONCETRATION (test code = MCHC) 34.6 gram/dL 33.0-36.

0    N             

 

             RED CELL DISTRIBUTION WIDTH (test code = RDW) 12.1 %       11.6-16.

2    N             

 

             PLATELET COUNT (test code = PLT) 191 K/mm3    150-450      N       

      

 

             MEAN PLATELET VOLUME (test code = MPV) 10.9 fL      6.7-11.0     N 

            





CBC W/O [id number] 04:44:00* 



             Test Item    Value        Reference Range Interpretation Comments

 

             WHITE BLOOD CELL (test code = WBC)  K/mm3       4.5-12.5           

        

 

             RED BLOOD CELL (test code = RBC)  mill/mm3    4.0-5.8              

      

 

             HEMOGLOBIN (test code = HGB) 15.3 gram/dL 13.0-17.5    N           

  

 

             HEMATOCRIT (test code = HCT) 44.2 %       42.0-52.0    N           

  

 

             MEAN CELL VOLUME (test code = MCV)  fL          80-98              

        

 

             MEAN CELL HGB (test code = MCH)  picogram    27.0-33.0             

     

 

             MEAN CELL HGB CONCETRATION (test code = MCHC)  gram/dL     33.0-36.

0                  

 

             RED CELL DISTRIBUTION WIDTH (test code = RDW)  %           11.6-16.

2                  

 

             PLATELET COUNT (test code = PLT)  K/mm3       150-450              

      

 

             MEAN PLATELET VOLUME (test code = MPV)  fL          6.7-11.0       

            





COMPREHENSIVE METABOLIC [id number] 06:49:00* 



             Test Item    Value        Reference Range Interpretation Comments

 

             SODIUM (test code = NA) 138 mmol/L   136-145      N             

 

             POTASSIUM (test code = K) 4.0 mmol/L   3.5-5.1      N             

 

             CHLORIDE (test code = CL) 105.0 mmol/L        N             

 

             CARBON DIOXIDE (test code = CO2) 24.0 mmol/L  21-32        N       

      

 

             ANION GAP (test code = GAP) 13.0         10-20        N            

 

 

             GLUCOSE (test code = GLU) 88 mg/dL            N             

 

             BLOOD UREA NITROGEN (test code = BUN) 16 mg/dL     7-18         N  

           

 

             GLOMERULAR FILTRATION RATE (test code = GFR) > 60 mL/min  >=60     

                 Estimated GFR by

using Modified MDRD formula.Chronic kidney disease is defined as either kidney 
damageor GFR <60 mL/min/1.73 m2 for >3 months.

 

             CREATININE (test code = CREAT) 0.90 mg/dL   0.7-1.3      N         

    

 

             BUN/CREATININE RATIO (test code = BUN/CREA) 18.4         10-20     

   N             

 

             TOTAL PROTEIN (test code = PROT) 6.6 gram/dL  6.4-8.2      N       

      

 

             ALBUMIN (test code = ALB) 3.4 g/dL     3.4-5.0      N             

 

             GLOBULIN (test code = GLOB) 3.2 gram/dL  2.7-4.2      N            

 

 

             ALBUMIN/GLOBULIN RATIO (test code = A/G) 1.1          0.75-1.50    

N             

 

             CALCIUM (test code = CA) 8.6 mg/dL    8.5-10.1     N             

 

             BILIRUBIN TOTAL (test code = BILT) 0.60 mg/dL   0.0-1.0      N     

        

 

             SGOT/AST (test code = AST) 22 IUnit/L   15-37        N             

 

             SGPT/ALT (test code = ALT) 26 IUnit/L   12-78        N             

 

             ALKALINE PHOSPHATASE TOTAL (test code = ALKP) 64 IUnit/L     

     N            **Note change 

in reference range due to change in reagent.**





[id number] 06:49:00* 



             Test Item    Value        Reference Range Interpretation Comments

 

             PHOSPHORUS (test code = PHOS) 3.0 mg/dL    2.5-4.9      N          

   





[id number] 06:49:00* 



             Test Item    Value        Reference Range Interpretation Comments

 

             MAGNESIUM (test code = MAG) 2.5 mg/dL    1.8-2.4      H            

 





CALCIUM [id number] 06:49:00* 



             Test Item    Value        Reference Range Interpretation Comments

 

             CALCIUM IONIZED (test code = LALO) 1.23 mmol/L  1.12-1.32    N      

       





COMPREHENSIVE METABOLIC [id number] 06:38:00* 



             Test Item    Value        Reference Range Interpretation Comments

 

             SODIUM (test code = NA) 138 mmol/L   136-145      N             

 

             POTASSIUM (test code = K) 4.0 mmol/L   3.5-5.1      N             

 

             CHLORIDE (test code = CL) 105.0 mmol/L        N             

 

             CARBON DIOXIDE (test code = CO2)  mmol/L      21-32                

      

 

             ANION GAP (test code = GAP)              10-20                     

 

 

             GLUCOSE (test code = GLU)  mg/dL                            

 

             BLOOD UREA NITROGEN (test code = BUN)  mg/dL       7-18            

           

 

             GLOMERULAR FILTRATION RATE (test code = GFR)  mL/min      >=60     

                  

 

             CREATININE (test code = CREAT)  mg/dL       0.7-1.3                

    

 

             BUN/CREATININE RATIO (test code = BUN/CREA)              10-20     

                 

 

             TOTAL PROTEIN (test code = PROT)  gram/dL     6.4-8.2              

      

 

             ALBUMIN (test code = ALB)  g/dL        3.4-5.0                    

 

             GLOBULIN (test code = GLOB)  gram/dL     2.7-4.2                   

 

 

             ALBUMIN/GLOBULIN RATIO (test code = A/G)              0.75-1.50    

              

 

             CALCIUM (test code = CA)  mg/dL       8.5-10.1                   

 

             BILIRUBIN TOTAL (test code = BILT)  mg/dL       0.0-1.0            

        

 

             SGOT/AST (test code = AST)  IUnit/L     15-37                      

 

             SGPT/ALT (test code = ALT)  IUnit/L     12-78                      

 

             ALKALINE PHOSPHATASE TOTAL (test code = ALKP)  IUnit/L       

                   





[id number] 06:38:00* 



             Test Item    Value        Reference Range Interpretation Comments

 

             PHOSPHORUS (test code = PHOS)  mg/dL       2.5-4.9                 

   





[id number] 06:38:00* 



             Test Item    Value        Reference Range Interpretation Comments

 

             MAGNESIUM (test code = MAG)  mg/dL       1.8-2.4                   

 





CALCIUM [id number] 06:38:00* 



             Test Item    Value        Reference Range Interpretation Comments

 

             CALCIUM IONIZED (test code = LALO) 1.23 mmol/L  1.12-1.32    N      

       





COMPREHENSIVE METABOLIC [id number] 06:33:00* 



             Test Item    Value        Reference Range Interpretation Comments

 

             SODIUM (test code = NA)  mmol/L      136-145                    

 

             POTASSIUM (test code = K)  mmol/L      3.5-5.1                    

 

             CHLORIDE (test code = CL)  mmol/L                           

 

             CARBON DIOXIDE (test code = CO2)  mmol/L      21-32                

      

 

             ANION GAP (test code = GAP)              10-20                     

 

 

             GLUCOSE (test code = GLU)  mg/dL                            

 

             BLOOD UREA NITROGEN (test code = BUN)  mg/dL       7-18            

           

 

             GLOMERULAR FILTRATION RATE (test code = GFR)  mL/min      >=60     

                  

 

             CREATININE (test code = CREAT)  mg/dL       0.7-1.3                

    

 

             BUN/CREATININE RATIO (test code = BUN/CREA)              10-20     

                 

 

             TOTAL PROTEIN (test code = PROT)  gram/dL     6.4-8.2              

      

 

             ALBUMIN (test code = ALB)  g/dL        3.4-5.0                    

 

             GLOBULIN (test code = GLOB)  gram/dL     2.7-4.2                   

 

 

             ALBUMIN/GLOBULIN RATIO (test code = A/G)              0.75-1.50    

              

 

             CALCIUM (test code = CA)  mg/dL       8.5-10.1                   

 

             BILIRUBIN TOTAL (test code = BILT)  mg/dL       0.0-1.0            

        

 

             SGOT/AST (test code = AST)  IUnit/L     15-37                      

 

             SGPT/ALT (test code = ALT)  IUnit/L     12-78                      

 

             ALKALINE PHOSPHATASE TOTAL (test code = ALKP)  IUnit/L       

                   





[id number] 06:33:00* 



             Test Item    Value        Reference Range Interpretation Comments

 

             PHOSPHORUS (test code = PHOS)  mg/dL       2.5-4.9                 

   





[id number] 06:33:00* 



             Test Item    Value        Reference Range Interpretation Comments

 

             MAGNESIUM (test code = MAG)  mg/dL       1.8-2.4                   

 





CALCIUM [id number] 06:33:00* 



             Test Item    Value        Reference Range Interpretation Comments

 

             CALCIUM IONIZED (test code = LALO) 1.23 mmol/L  1.12-1.32    N      

       





CBC W/AUTO [id number] 05:51:00* 



             Test Item    Value        Reference Range Interpretation Comments

 

             WHITE BLOOD CELL (test code = WBC) 7.7 K/mm3    4.5-12.5     N     

        

 

             RED BLOOD CELL (test code = RBC) 4.37 mill/mm3 4.0-5.8      N      

       

 

             HEMOGLOBIN (test code = HGB) 14.2 gram/dL 13.0-17.5    N           

  

 

             HEMATOCRIT (test code = HCT) 41.9 %       42.0-52.0    L           

  

 

             MEAN CELL VOLUME (test code = MCV) 95.9 fL      80-98        N     

        

 

             MEAN CELL HGB (test code = MCH) 32.5 picogram 27.0-33.0    N       

      

 

             MEAN CELL HGB CONCETRATION (test code = MCHC) 33.9 gram/dL 33.0-36.

0    N             

 

             RED CELL DISTRIBUTION WIDTH (test code = RDW) 12.3 %       11.6-16.

2    N             

 

             RED CELL DISTRIBUTION WIDTH SD (test code = RDW-SD) 43.5 fL      37

.0-51.0    N             

 

             PLATELET COUNT (test code = PLT) 188 K/mm3    150-450      N       

      

 

             MEAN PLATELET VOLUME (test code = MPV) 10.9 fL      6.7-11.0     N 

            

 

             NEUTROPHIL % (test code = NT%) 63.4 %       39.0-69.0    N         

    

 

             IMMATURE GRANULOCYTE % (test code = IG%) 0.4 %        0.0-5.0      

N             

 

             LYMPHOCYTE % (test code = LY%) 23.9 %       25.0-55.0    L         

    

 

             MONOCYTE % (test code = MO%) 10.8 %       0.0-10.0     H           

  

 

             EOSINOPHIL % (test code = EO%) 0.8 %        0.0-5.0      N         

    

 

             BASOPHIL % (test code = BA%) 0.7 %        0.0-1.0      N           

  

 

             NUCLEATED RBC % (test code = NRBC%) 0.0 %        0-0          N    

         

 

             NEUTROPHIL # (test code = NT#) 4.88 K/mm3   1.8-7.7      N         

    

 

             IMMATURE GRANULOCYTE # (test code = IG#) 0.03 x10 3/uL 0-0.03      

 N             

 

             LYMPHOCYTE # (test code = LY#) 1.84 K/mm3   1.0-5.0      N         

    

 

             MONOCYTE # (test code = MO#) 0.83 K/mm3   0-0.8        H           

  

 

             EOSINOPHIL # (test code = EO#) 0.06 K/mm3   0.0-0.5      N         

    

 

             BASOPHIL # (test code = BA#) 0.05 K/mm3   0.0-0.2      N           

  

 

             NUCLEATED RBC # (test code = NRBC#) 0.00 K/mm3   0.0-0.1      N    

         

 

             MANUAL DIFF REQUIRED (test code = MDIFF) NO                        

              





BASIC METABOLIC [id number] 09:15:00* 



             Test Item    Value        Reference Range Interpretation Comments

 

             SODIUM (test code = NA) 139 mmol/L   136-145      N             

 

             POTASSIUM (test code = K) 4.4 mmol/L   3.5-5.1      N             

 

             CHLORIDE (test code = CL) 104.0 mmol/L        N             

 

             CARBON DIOXIDE (test code = CO2) 26.0 mmol/L  21-32        N       

      

 

             ANION GAP (test code = GAP) 13.4         10-20        N            

 

 

             GLUCOSE (test code = GLU) 96 mg/dL            N             

 

             BLOOD UREA NITROGEN (test code = BUN) 14 mg/dL     7-18         N  

           

 

             GLOMERULAR FILTRATION RATE (test code = GFR) > 60 mL/min  >=60     

                 Estimated GFR by

using Modified MDRD formula.Chronic kidney disease is defined as either kidney 
damageor GFR <60 mL/min/1.73 m2 for >3 months.

 

             CREATININE (test code = CREAT) 1.00 mg/dL   0.7-1.3      N         

    

 

             BUN/CREATININE RATIO (test code = BUN/CREA) 14.0         10-20     

   N             

 

             CALCIUM (test code = CA) 8.7 mg/dL    8.5-10.1     N             





COMPREHENSIVE METABOLIC [id number] 09:15:00* 



             Test Item    Value        Reference Range Interpretation Comments

 

             TOTAL PROTEIN (test code = PROT) 7.3 gram/dL  6.4-8.2      N       

      

 

             ALBUMIN (test code = ALB) 3.7 g/dL     3.4-5.0      N             

 

             GLOBULIN (test code = GLOB) 3.6 gram/dL  2.7-4.2      N            

 

 

             ALBUMIN/GLOBULIN RATIO (test code = A/G) 1.0          0.75-1.50    

N             

 

             BILIRUBIN TOTAL (test code = BILT) 0.80 mg/dL   0.0-1.0      N     

        

 

             SGOT/AST (test code = AST) 29 IUnit/L   15-37        N             

 

             SGPT/ALT (test code = ALT) 26 IUnit/L   12-78        N             

 

             ALKALINE PHOSPHATASE TOTAL (test code = ALKP) 70 IUnit/L     

     N            **Note change 

in reference range due to change in reagent.**





[id number] 09:15:00* 



             Test Item    Value        Reference Range Interpretation Comments

 

             PHOSPHORUS (test code = PHOS) 2.8 mg/dL    2.5-4.9      N          

   





[id number] 09:15:00* 



             Test Item    Value        Reference Range Interpretation Comments

 

             MAGNESIUM (test code = MAG) 2.9 mg/dL    1.8-2.4      H            

 





CALCIUM [id number] 09:15:00* 



             Test Item    Value        Reference Range Interpretation Comments

 

             CALCIUM IONIZED (test code = LALO) 1.23 mmol/L  1.12-1.32    N      

       





BASIC METABOLIC [id number] 06:34:00* 



             Test Item    Value        Reference Range Interpretation Comments

 

             SODIUM (test code = NA)  mmol/L      136-145                    

 

             POTASSIUM (test code = K)  mmol/L      3.5-5.1                    

 

             CHLORIDE (test code = CL)  mmol/L                           

 

             CARBON DIOXIDE (test code = CO2)  mmol/L      21-32                

      

 

             ANION GAP (test code = GAP)              10-20                     

 

 

             GLUCOSE (test code = GLU)  mg/dL                            

 

             BLOOD UREA NITROGEN (test code = BUN)  mg/dL       7-18            

           

 

             GLOMERULAR FILTRATION RATE (test code = GFR)  mL/min      >=60     

                  

 

             CREATININE (test code = CREAT)  mg/dL       0.7-1.3                

    

 

             BUN/CREATININE RATIO (test code = BUN/CREA)              10-20     

                 

 

             CALCIUM (test code = CA)  mg/dL       8.5-10.1                   





COMPREHENSIVE METABOLIC [id number] 06:34:00* 



             Test Item    Value        Reference Range Interpretation Comments

 

             TOTAL PROTEIN (test code = PROT)  gram/dL     6.4-8.2              

      

 

             ALBUMIN (test code = ALB)  g/dL        3.4-5.0                    

 

             GLOBULIN (test code = GLOB)  gram/dL     2.7-4.2                   

 

 

             ALBUMIN/GLOBULIN RATIO (test code = A/G)              0.75-1.50    

              

 

             BILIRUBIN TOTAL (test code = BILT)  mg/dL       0.0-1.0            

        

 

             SGOT/AST (test code = AST)  IUnit/L     15-37                      

 

             SGPT/ALT (test code = ALT)  IUnit/L     12-78                      

 

             ALKALINE PHOSPHATASE TOTAL (test code = ALKP)  IUnit/L       

                   





[id number] 06:34:00* 



             Test Item    Value        Reference Range Interpretation Comments

 

             PHOSPHORUS (test code = PHOS)  mg/dL       2.5-4.9                 

   





[id number] 06:34:00* 



             Test Item    Value        Reference Range Interpretation Comments

 

             MAGNESIUM (test code = MAG)  mg/dL       1.8-2.4                   

 





CALCIUM [id number] 06:34:00* 



             Test Item    Value        Reference Range Interpretation Comments

 

             CALCIUM IONIZED (test code = LALO) 1.23 mmol/L  1.12-1.32    N      

       





CBC W/O [id number] 06:02:00* 



             Test Item    Value        Reference Range Interpretation Comments

 

             WHITE BLOOD CELL (test code = WBC) 7.7 K/mm3    4.5-12.5     N     

        

 

             RED BLOOD CELL (test code = RBC) 4.46 mill/mm3 4.0-5.8      N      

       

 

             HEMOGLOBIN (test code = HGB) 14.7 gram/dL 13.0-17.5    N           

  

 

             HEMATOCRIT (test code = HCT) 43.0 %       42.0-52.0    N           

  

 

             MEAN CELL VOLUME (test code = MCV) 96.4 fL      80-98        N     

        

 

             MEAN CELL HGB (test code = MCH) 33.0 picogram 27.0-33.0    N       

      

 

             MEAN CELL HGB CONCETRATION (test code = MCHC) 34.2 gram/dL 33.0-36.

0    N             

 

             RED CELL DISTRIBUTION WIDTH (test code = RDW) 12.6 %       11.6-16.

2    N             

 

             PLATELET COUNT (test code = PLT) 191 K/mm3    150-450      N       

      

 

             MEAN PLATELET VOLUME (test code = MPV) 10.5 fL      6.7-11.0     N 

            





CBC W/O [id number] 06:01:00* 



             Test Item    Value        Reference Range Interpretation Comments

 

             WHITE BLOOD CELL (test code = WBC)  K/mm3       4.5-12.5           

        

 

             RED BLOOD CELL (test code = RBC)  mill/mm3    4.0-5.8              

      

 

             HEMOGLOBIN (test code = HGB) 14.7 gram/dL 13.0-17.5    N           

  

 

             HEMATOCRIT (test code = HCT) 43.0 %       42.0-52.0    N           

  

 

             MEAN CELL VOLUME (test code = MCV)  fL          80-98              

        

 

             MEAN CELL HGB (test code = MCH)  picogram    27.0-33.0             

     

 

             MEAN CELL HGB CONCETRATION (test code = MCHC)  gram/dL     33.0-36.

0                  

 

             RED CELL DISTRIBUTION WIDTH (test code = RDW)  %           11.6-16.

2                  

 

             PLATELET COUNT (test code = PLT)  K/mm3       150-450              

      

 

             MEAN PLATELET VOLUME (test code = MPV)  fL          6.7-11.0       

            





- CTA [id number] 21:31:00  Name: HARPER VENTURA                      [facility]                     : 1934 Age/S: 85  / M         [address] 
[facility]                Unit #: [id number]     Loc:               [place]  [address]              Phys: Sharif,Roozbeh MD                                 
                Acct: [id number]  Dis Date:               Status: ADM IN      
                           PHONE #: [phone]     Exam Date: 2019  8300
                    FAX #: [phone]      Reason: r/o cva                    
                        EXAMS:                                               CPT
CODE:      [id number] CTA NECK                                   18100           
                REASON FOR EXAM: r/o cva               EXAM ORDER DATE: 
2019 9:32 AM               Ordering M.D.: Roozbeh Sharif, MD               
PROCEDURE:  - CTA NECK                COMPARISON:               FINDINGS: Axial 
images of the neck were obtained with IV contrast.       Dose reduction 
techniques were applied.  Reconstructed 3-D angiogram       of the cervical 
vessels as well as intraluminal vessel analysis were       also provided for 
interpretation               The vertebral arteries are unremarkable.  Small 
calcified plaques       noted within the proximal ICAs bilaterally.  On the 
right, the       stenosis measures approximately 40%.  On the left is stenosis  
    measuring approximately 30%                 IMPRESSION: Bilateral calcified 
proximal internal carotid plaques         measuring 40% on the right and 30% of 
the left          ** Electronically Signed by MARTI Fernandez on 2019 at 2975
**                      Reported and signed by: Jhonny Fernandez M.D.                CC:
Mira Melendez MD; Sharif,Roozbeh MD                                           
                                                       Technologist:Sebastian Bear RT(R)(CT); .  CTDI:        DLP:        Trnscb Date/Time: 2019 ()
Lynsey.VTL                        Orig Print D/T: S: 2019 ()      PAGE 
1                       Signed Report                               - CTA HEAD
2019 21:16:00  [facility] HARPER VENTURA                      [facility]     
               : 1934 Age/S: 85  / M         [address] [facility]          
     Unit #: [id number]     Loc:               [place]  [address]             
Phys: Sharif,Roozbeh MD                                                  Acct: 
[id number]  Dis Date:               Status: ADM IN                            
     PHONE #: [phone]     Exam Date: 2019  1654                     
FAX #: [phone]      Reason: r/o cva                                        
    EXAMS:                                               CPT CODE:      
[id number] CTA HEAD                                   25198                      
     REASON FOR EXAM: r/o cva               EXAM ORDER DATE: 2019 9:32 AM  
            Ordering M.D.: Roozbeh Sharif, MD               PROCEDURE:  - CTA 
HEAD               COMPARISON:               FINDINGS: Axial images of the head 
were obtained with IV contrast.       Dose reduction techniques were applied.  
Reconstructed 3-D angiogram       of the cerebral vessels as well as 
intraluminal vessel analysis were       also provided for interpretation        
      The anterior, middle, and posterior cerebral arteries are       
unremarkable.  The visualized segment of the ICAs are unremarkable.        The 
basilar artery is within normal limits                 IMPRESSION: Unremarkable 
cerebral angiogram          ** Electronically Signed by MARTI Fernandez on 
2019 at 2116 **                      Reported and signed by: Jhonny Fernandez M.D.
                 CC: Mira Melendez MD; Sharif,Roozbeh MD                      
                                                                            
Technologist:Sebastian Bear RT(R)(CT); .  CTDI:        DLP:        Trnscb 
Date/Time: 2019 () Katheryn                        Orig Print D/T: S:
2019 ()      PAGE  1                       Signed Report              
                - MRI C-SPINE W/O [id number] 18:24:00 FAX:         
Mira Melendez MD    [phone]    Gleason: B   St: ADM FAX: Palmira Pollack [phone]   ----------------------------------------
---------------------------------------  Name:   HARPER VENTURA                   
 [facility]                     : 1934  Age/S: 85/M           [address] 
[facility]                Unit #: [id number]      Loc: [address]        [place]  [address]              Phys: Palmira Stevens MD                           
                Acct: [id number] Dis Date:               Status: ADM IN       
                         PHONE #: [phone]     Exam Date:     2019    
1749                   FAX #: [phone]     Reason: Cervical spinal stenosis 
                         EXAMS:                                               
CPT CODE:      [id number] MRI C-SPINE W/O CONT                       [address]88       
            REASON FOR EXAM: Cervical spinal stenosis               Exam Order 
Date: 2019 4:21 PM               Attending M.D.: Palmira Stevens MD  
            Comparison:               Procedure:  - MRI C-SPINE W/O CONT        
      FINDINGS: Sagittal and axial images of the cervical were obtained in      
in T1, T2, and proton density with fat saturation. No IV gadolinium       was 
given.               The sagittal images show within normal alignment of the 
cervical.       Large broad-based disc herniation with severe narrowing of the 
central       canal at C4-5.  Moderate flattening of the cord consistent with co
rd       compression.  Additional moderate broad-based disc bulge is also seen  
    at C3-4 C5-6 and C6-7.  No evidence of diskitis or osteomyelitis.           
    The cord is unremarkable without evidence of intramedullary mass.           
     IMPRESSION:          C3-4, C6-7: Moderate broad-based disc bulge with mild 
central canal         stenosis.  No evidence of cord compression or neural fo
raminal         stenosis         C4-5: Severe broad-based disc bulge with severe
central canal stenosis         and flattening the cord consistent with cord com
pression.  Minimal         narrowing of bilateral neural foramen.  The central c
anal stenosis and         cord compression are most severe at C4-5.         C5-6
: Severe broad-based disc bulge with severe central canal stenosis         and f
lattening of the cord consistent with cord compression.  No         evidence of 
foraminal stenosis.                   Dr. Stevens was informed of the finding
s by telephone at 6:24 PM                   *******************FOR INTERNAL ALIRIO
NG PURPOSES ONLY****************         RESULT CODE: CVR                       
     PAGE  1                       Signed Report                    (CONTINUED) 
FAX:         Mira Melendez MD    [phone]    Gleason: B   St: ADM FAX: aPlmira Pollack [phone]   ---------------------------------------
----------------------------------------  Name:   HARPER VENTURA                  
  [facility]                     : 1934  Age/S: 85/M           [address]
[facility]                Unit #: [id number]      Loc: [address]  [address]              Phys: Palmira Stevens MD                           
                Acct: [id number] Dis Date:               Status: ADM IN       
                         PHONE #: [phone]     Exam Date:     2019    
174                   FAX #: [phone]     Reason: Cervical spinal stenosis 
                         EXAMS:                                               
CPT CODE:      [id number] MRI C-SPINE W/O CONT                       37104       
       <Continued>      ** Electronically Signed by MARTI Fernandez on 2019 
at 1824 **                      Reported and signed by: Jhonny Fernandez M.D.           
                           CC: Mira Melendez MD; Palmira Stevens MD      
                                                                                
     Technologist: KATELYNN CARROLLRT - MRI                                 
[facility] Date/Time/By: 2019 () : By: Katheryn           Orig Print 
D/T: S: 2019 ()                         PAGE  2                       
Signed Report                               - MRI BRAIN W/O [id number] 
18:15:00 FAX:         Mira Melendez MD    [phone]    Gleason: B   St: ADM 
FAX: Palmira Pollack [phone]   
------------------------------------------------------------------------------- 
Name:   HARPER VENTURA                     [facility]                     :
1934  Age/S: 85/M           [address] [facility]                Unit #: 
[id number]      Loc: [address] [place]  [address]              Phys: 
Palmira Stevens MD                                            Acct: 
[id number] Dis Date:               Status: ADM IN                             
   PHONE #: [phone]     Exam Date:     2019     1744                 
 FAX #: [phone]     Reason: AMS                                            
   EXAMS:                                               CPT CODE:      [id number]
MRI BRAIN W/O CONTRAST                     [address]                    REASON FOR 
EXAM: AMS               Exam Order Date: 2019 4:21 PM               
Attending M.D.: Palmira Stevens MD               Procedure:  - MRI BRAIN 
W/O CONTRAST               Comparison:               FINDINGS: Axial, sagittal, 
and coronal images of the head were       obtained using T1, T2 weighted, 
inversion recovery, and gradient echo       sequences.  The diffusion images are
within normal limits without       abnormal signal intensity to suggest acute 
infarct. No IV gadolinium       was given.               The sagittal images 
show normal pituitary, cerebellum, and brain stem.       No evidence of 
suprasellar mass.               The axial T2, inversion recovery, and gradient 
echo images show no       evidence of intra or extra axial mass. The ventricles,
cisterns, and       sulci are minimally prominent. No evidence of hemorrhage.  
Nonspecific       punctate white matter disease in the periventricular region.  
            The cerebellar pontine angle area is within normal limits. There is 
no       evidence of mass noted.               The axial T1 images show no evid
ence of mass.               No evidence of old infarct.                       Th
e coronal images show normal optic chiasm.                 IMPRESSION: Nonspecif
ic deep white matter disease.  Age-appropriate         generalized atrophy.  No 
acute findings          ** Electronically Signed by MARTI Fernandez on 2019 a
t 6945 **                      Reported and signed by: Jhonny Fernandez M.D.         PAG
E  1                       Signed Report                    (CONTINUED)  FAX:   
     Mira Melendez MD    [phone]    Gleason:    St: [facility] FAX: Palmira Bauer [phone]   ------------------------------------------------
-------------------------------  Name:   HARPER VENTURA
[place]                     : 1934  Age/S: 85/M           [address] [facility]                Unit #: [id number]      Loc: [address]        [place],  [place]  [address]              Phys: Palmira Stevens MD                                  
         Acct: [id number] Dis Date:               Status: ADM IN              
                  PHONE #: [phone]     Exam Date:     2019     174  
                FAX #: [phone]     Reason: AMS                             
                  EXAMS:                                               CPT CODE:
     [id number] MRI BRAIN W/O CONTRAST                     [address]               <
Continued>                                       CC: Mira Melendez MD; 
Palmira Stevens MD                                                        
                                    Technologist: RT TENZIN - MRI        
                        Trnscrd Date/Time/By: 2019 () : By: PatriciaVTL 
         Orig Print D/T: S: 2019 ()                         PAGE  2   
                   Signed Report                               CBC W/AUTO DIFF
2019 06:43:00* 



             Test Item    Value        Reference Range Interpretation Comments

 

             WHITE BLOOD CELL (test code = WBC) 8.4 K/mm3    4.5-12.5     N     

        

 

             RED BLOOD CELL (test code = RBC) 4.76 mill/mm3 4.0-5.8      N      

       

 

             HEMOGLOBIN (test code = HGB) 15.2 gram/dL 13.0-17.5    N           

  

 

             HEMATOCRIT (test code = HCT) 44.6 %       42.0-52.0    N           

  

 

             MEAN CELL VOLUME (test code = MCV) 93.7 fL      80-98        N     

        

 

             MEAN CELL HGB (test code = MCH) 31.9 picogram 27.0-33.0    N       

      

 

             MEAN CELL HGB CONCETRATION (test code = MCHC) 34.1 gram/dL 33.0-36.

0    N             

 

             RED CELL DISTRIBUTION WIDTH (test code = RDW) 12.2 %       11.6-16.

2    N             

 

             RED CELL DISTRIBUTION WIDTH SD (test code = RDW-SD) 42.5 fL      37

.0-51.0    N             

 

             PLATELET COUNT (test code = PLT) 192 K/mm3    150-450      N       

      

 

             MEAN PLATELET VOLUME (test code = MPV) 10.6 fL      6.7-11.0     N 

            

 

             NEUTROPHIL % (test code = NT%) 77.3 %       39.0-69.0    H         

    

 

             IMMATURE GRANULOCYTE % (test code = IG%) 0.2 %        0.0-5.0      

N             

 

             LYMPHOCYTE % (test code = LY%) 13.4 %       25.0-55.0    L         

    

 

             MONOCYTE % (test code = MO%) 8.5 %        0.0-10.0     N           

  

 

             EOSINOPHIL % (test code = EO%) 0.1 %        0.0-5.0      N         

    

 

             BASOPHIL % (test code = BA%) 0.5 %        0.0-1.0      N           

  

 

             NUCLEATED RBC % (test code = NRBC%) 0.0 %        0-0          N    

         

 

             NEUTROPHIL # (test code = NT#) 6.52 K/mm3   1.8-7.7      N         

    

 

             IMMATURE GRANULOCYTE # (test code = IG#) 0.02 x10 3/uL 0-0.03      

 N             

 

             LYMPHOCYTE # (test code = LY#) 1.13 K/mm3   1.0-5.0      N         

    

 

             MONOCYTE # (test code = MO#) 0.72 K/mm3   0-0.8        N           

  

 

             EOSINOPHIL # (test code = EO#) 0.01 K/mm3   0.0-0.5      N         

    

 

             BASOPHIL # (test code = BA#) 0.04 K/mm3   0.0-0.2      N           

  

 

             NUCLEATED RBC # (test code = NRBC#) 0.00 K/mm3   0.0-0.1      N    

         

 

             MANUAL DIFF REQUIRED (test code = MDIFF) NO                        

              





BASIC METABOLIC [id number] 02:29:00* 



             Test Item    Value        Reference Range Interpretation Comments

 

             SODIUM (test code = NA) 142 mmol/L   136-145                   RESU

LT VERIFIED BY REPEAT ANALYSIS

 

             POTASSIUM (test code = K) 3.4 mmol/L   3.5-5.1      L             

 

             CHLORIDE (test code = CL) 106.0 mmol/L        N             

 

             CARBON DIOXIDE (test code = CO2) 25.0 mmol/L  21-32        N       

      

 

             ANION GAP (test code = GAP) 14.4         10-20        N            

 

 

             GLUCOSE (test code = GLU) 100 mg/dL           N             

 

             BLOOD UREA NITROGEN (test code = BUN) 16 mg/dL     7-18         N  

           

 

             GLOMERULAR FILTRATION RATE (test code = GFR) > 60 mL/min  >=60     

                 Estimated GFR by

using Modified MDRD formula.Chronic kidney disease is defined as either kidney 
damageor GFR <60 mL/min/1.73 m2 for >3 months.

 

             CREATININE (test code = CREAT) 0.90 mg/dL   0.7-1.3      N         

    

 

             BUN/CREATININE RATIO (test code = BUN/CREA) 17.6         10-20     

   N             

 

             CALCIUM (test code = CA) 8.6 mg/dL    8.5-10.1     N             





COMPREHENSIVE METABOLIC [id number] 02:29:00* 



             Test Item    Value        Reference Range Interpretation Comments

 

             TOTAL PROTEIN (test code = PROT) 7.5 gram/dL  6.4-8.2      N       

      

 

             ALBUMIN (test code = ALB) 3.9 g/dL     3.4-5.0      N             

 

             GLOBULIN (test code = GLOB) 3.6 gram/dL  2.7-4.2      N            

 

 

             ALBUMIN/GLOBULIN RATIO (test code = A/G) 1.1          0.75-1.50    

N             

 

             BILIRUBIN TOTAL (test code = BILT) 0.50 mg/dL   0.0-1.0      N     

        

 

             SGOT/AST (test code = AST) 18 IUnit/L   15-37        N             

 

             SGPT/ALT (test code = ALT) 25 IUnit/L   12-78        N             

 

             ALKALINE PHOSPHATASE TOTAL (test code = ALKP) 71 IUnit/L     

     N            **Note change 

in reference range due to change in reagent.**





[id number] 02:29:00* 



             Test Item    Value        Reference Range Interpretation Comments

 

             PHOSPHORUS (test code = PHOS) 2.7 mg/dL    2.5-4.9      N          

   





[id number] 02:29:00* 



             Test Item    Value        Reference Range Interpretation Comments

 

             MAGNESIUM (test code = MAG) 2.5 mg/dL    1.8-2.4      H            

 





CALCIUM [id number] 02:29:00* 



             Test Item    Value        Reference Range Interpretation Comments

 

             CALCIUM IONIZED (test code = LALO) 1.24 mmol/L  1.12-1.32    N      

       





[id number] 02:15:00* 



             Test Item    Value        Reference Range Interpretation Comments

 

             TROPONIN-I (test code = TROPI) 0.016 ng/mL  0-0.045      N         

    





COMMENTS TO PHLEBOTOMIST: COLLECT 3 HOURS AFTER PREVIOUS                        
  SAMPLEBASIC METABOLIC [id number] 02:12:00* 



             Test Item    Value        Reference Range Interpretation Comments

 

             SODIUM (test code = NA)  mmol/L      136-145                    

 

             POTASSIUM (test code = K)  mmol/L      3.5-5.1                    

 

             CHLORIDE (test code = CL)  mmol/L                           

 

             CARBON DIOXIDE (test code = CO2)  mmol/L      21-32                

      

 

             ANION GAP (test code = GAP)              10-20                     

 

 

             GLUCOSE (test code = GLU)  mg/dL                            

 

             BLOOD UREA NITROGEN (test code = BUN)  mg/dL       7-18            

           

 

             GLOMERULAR FILTRATION RATE (test code = GFR)  mL/min      >=60     

                  

 

             CREATININE (test code = CREAT)  mg/dL       0.7-1.3                

    

 

             BUN/CREATININE RATIO (test code = BUN/CREA)              10-20     

                 

 

             CALCIUM (test code = CA)  mg/dL       8.5-10.1                   





COMPREHENSIVE METABOLIC [id number] 02:12:00* 



             Test Item    Value        Reference Range Interpretation Comments

 

             TOTAL PROTEIN (test code = PROT)  gram/dL     6.4-8.2              

      

 

             ALBUMIN (test code = ALB)  g/dL        3.4-5.0                    

 

             GLOBULIN (test code = GLOB)  gram/dL     2.7-4.2                   

 

 

             ALBUMIN/GLOBULIN RATIO (test code = A/G)              0.75-1.50    

              

 

             BILIRUBIN TOTAL (test code = BILT)  mg/dL       0.0-1.0            

        

 

             SGOT/AST (test code = AST)  IUnit/L     15-37                      

 

             SGPT/ALT (test code = ALT)  IUnit/L     12-78                      

 

             ALKALINE PHOSPHATASE TOTAL (test code = ALKP)  IUnit/L       

                   





[id number] 02:12:00* 



             Test Item    Value        Reference Range Interpretation Comments

 

             PHOSPHORUS (test code = PHOS)  mg/dL       2.5-4.9                 

   





[id number] 02:12:00* 



             Test Item    Value        Reference Range Interpretation Comments

 

             MAGNESIUM (test code = MAG)  mg/dL       1.8-2.4                   

 





CALCIUM [id number] 02:12:00* 



             Test Item    Value        Reference Range Interpretation Comments

 

             CALCIUM IONIZED (test code = LALO) 1.24 mmol/L  1.12-1.32    N      

       





[id number] 23:36:00* 



             Test Item    Value        Reference Range Interpretation Comments

 

             TROPONIN-I (test code = TROPI) <0.015 ng/mL 0-0.045      N         

    





MARILU HAS LABEL V.LAB. 09/15/19 2206COMMENTS TO PHLEBOTOMIST: COLLECT 3 HOURS
AFTER PREVIOUS                           SAMPLEURINALYSIS COMPLETE2019-09-15 
16:49:00* 



             Test Item    Value        Reference Range Interpretation Comments

 

             UA COLOR (test code = COLU) LIGHT YELLOW YELLOW                    

 

 

             UA APPEARANCE (test code = APPU) CLEAR        CLEAR                

      

 

             UA GLUCOSE DIPSTICK (test code = DGLUU) NEGATIVE mg/dL NEGATIVE    

               

 

             UA BILIRUBIN DIPSTICK (test code = BILU) NEGATIVE     NEGATIVE     

              

 

             UA KETONE DIPSTICK (test code = KETU) TRACE mg/dL  NEGATIVE        

           

 

             UA SPECIFIC GRAVITY (test code = SGU) 1.015        1.001-1.035     

           

 

             UA BLOOD DIPSTICK (test code = JANE) NEGATIVE     NEGATIVE          

         

 

             UA PH DIPSTICK (test code = NOEMÍ) 7.0          5.0-8.0              

      

 

             UA PROTEIN DIPSTICK (test code = PROU) NEGATIVE mg/dL Neg-15       

              

 

             UA UROBILINIOGEN DIPSTICK (test code = URO) 0.2 mg/dL    0.0-0.2   

                 

 

             UA NITRITE DIPSTICK (test code = CAROL) NEGATIVE     NEGATIVE       

            

 

             UA LEUKOCYTE ESTERASE W REFLEX (test code = LEUUR) NEGATIVE     NEG

ATIVE                   

 

             UA WBC (test code = WBCU) NONE SEEN per HPF 0-5                    

    

 

             UA RBC (test code = RBCU) 3-5 per HPF  0-5                        

 

             UA EPITHELIAL CELLS (test code = EPIU) None seen per HPF Few       

                 

 

             UA BACTERIA (test code = BACU) TRACE per HPF NONE                  

     





Urine Source? CatheterURINALYSIS COMPLETE2019-09-15 16:18:00* 



             Test Item    Value        Reference Range Interpretation Comments

 

             UA COLOR (test code = COLU) LIGHT YELLOW YELLOW                    

 

 

             UA APPEARANCE (test code = APPU) CLEAR        CLEAR                

      

 

             UA GLUCOSE DIPSTICK (test code = DGLUU) NEGATIVE mg/dL NEGATIVE    

               

 

             UA BILIRUBIN DIPSTICK (test code = BILU) NEGATIVE     NEGATIVE     

              

 

             UA KETONE DIPSTICK (test code = KETU) TRACE mg/dL  NEGATIVE        

           

 

             UA SPECIFIC GRAVITY (test code = SGU) 1.015        1.001-1.035     

           

 

             UA BLOOD DIPSTICK (test code = JANE) NEGATIVE     NEGATIVE          

         

 

             UA PH DIPSTICK (test code = NOEMÍ) 7.0          5.0-8.0              

      

 

             UA PROTEIN DIPSTICK (test code = PROU) NEGATIVE mg/dL Neg-15       

              

 

             UA UROBILINIOGEN DIPSTICK (test code = URO) 0.2 mg/dL    0.0-0.2   

                 

 

             UA NITRITE DIPSTICK (test code = CAROL) NEGATIVE     NEGATIVE       

            

 

             UA LEUKOCYTE ESTERASE W REFLEX (test code = LEUUR) NEGATIVE     NEG

ATIVE                   

 

             UA WBC (test code = WBCU)  per HPF     0-5                        

 

             UA RBC (test code = RBCU)  per HPF     0-5                        

 

             UA EPITHELIAL CELLS (test code = EPIU)  per HPF     Few            

            

 

             UA BACTERIA (test code = BACU)  per HPF     NONE                   

    





Urine Source? Catheter- XR CHEST 1 -09-15 15:47:00 FAX:         
Husam Robles MD    [phone]    Gleason:    St: REG----------
---------------------------------------------------------------------  Name:   HARPER FERNANDES                     [facility]                     : [birth year]  Age/S: 85/M           [address] [facility]                Unit #: [id number]    
 Loc: [place]  [address]              Phys: Husam Robles MD    
                                              Acct: [id number] Dis Date:      
        Status: REG ER                                 PHONE #: [phone]    
Exam Date:     09/15/2019     1525                   FAX #: [phone]     
Reason: CODE STROKE                                        EXAMS:               
                               CPT CODE:      [id number] XR CHEST 1 V            
                  [address]                            REASON FOR EXAM: CODE STROKE 
             EXAM ORDER DATE: 9/15/2019 2:36 PM               Ordering M.D.: 
Husam Robles MD                PROCEDURE:  - XR CHEST 1 V               DMITRI
RISON:               FINDINGS:  Portable AP frontal view of the chest obtained a
t 3:27 PM       shows clear lungs without evidence of consolidation. There is no
      evidence of effusion. The heart size is within normal limits.       Pulmo
nary vasculatures are unremarkable.                  IMPRESSION: No active disea
se.          ** Electronically Signed by MARTI Fernandez on 09/15/2019 at 1547 **  
                   Reported and signed by: Jhonny Fernandez M.D.                       
CC: Husam Robles MD                                                           
                                                           Technologist: Erna PARDO(R)                                Trnscrd Date/Time/By: 09/15/201
9 (0073) : By: PatriciaVTL           Orig Print D/T: S: 09/15/2019 (8671)         
               PAGE  1                       Signed Report                      
        BASIC METABOLIC PANEL2019-09-15 15:43:00* 



             Test Item    Value        Reference Range Interpretation Comments

 

             SODIUM (test code = NA) 150 mmol/L   136-145      H             

 

             POTASSIUM (test code = K) 4.1 mmol/L   3.5-5.1      N             

 

             CHLORIDE (test code = CL) 114.0 mmol/L        H             

 

             CARBON DIOXIDE (test code = CO2) 24.0 mmol/L  21-32        N       

      

 

             ANION GAP (test code = GAP) 16.1         10-20        N            

 

 

             GLUCOSE (test code = GLU) 119 mg/dL           H             

 

             BLOOD UREA NITROGEN (test code = BUN) 21 mg/dL     7-18         H  

           

 

             GLOMERULAR FILTRATION RATE (test code = GFR) > 60 mL/min  >=60     

                 Estimated GFR by

using Modified MDRD formula.Chronic kidney disease is defined as either kidney 
damageor GFR <60 mL/min/1.73 m2 for >3 months.

 

             CREATININE (test code = CREAT) 1.00 mg/dL   0.7-1.3      N         

    

 

             BUN/CREATININE RATIO (test code = BUN/CREA) 21.3         10-20     

   H             

 

             CALCIUM (test code = CA) 8.9 mg/dL    8.5-10.1     N             





MESKLMJC--31-15 15:43:00* 



             Test Item    Value        Reference Range Interpretation Comments

 

             TROPONIN-I (test code = TROPI) 0.017 ng/mL  0-0.045      N         

    





ALCOHOL2019-09-15 15:34:00* 



             Test Item    Value        Reference Range Interpretation Comments

 

             ALCOHOL (test code = ALC) < 3 mg/dL    0.0-3.0      N            --

---------------INTERPRETIVE DATA

NOTE:-------------------- POSITIVE SCREENING RESULTS SHOULD BE CONSIDERED 
PRESUMPTIVE.WHEN COLLECTED FOR MEDICAL PURPOSES ONLY.  SPECIMEN WILL NOTBE 
COLLECTED BY CHAIN OF CUSTODY.IF A CONFIRMATION OF POSITIVE RESULTS IS DESIRED, 
ACONFIRMATION TEST MUST BE REQUESTED BY THE PHYSICIAN AT ANADDITIONAL CHARGE TO 
THE PATIENT.





AMMONIA2019-09-15 15:34:00* 



             Test Item    Value        Reference Range Interpretation Comments

 

             AMMONIA (test code = AMM) 23 umol/L    11-32        N             





BASIC METABOLIC PANEL2019-09-15 15:30:00* 



             Test Item    Value        Reference Range Interpretation Comments

 

             SODIUM (test code = NA) 150 mmol/L   136-145      H             

 

             POTASSIUM (test code = K) 4.1 mmol/L   3.5-5.1      N             

 

             CHLORIDE (test code = CL) 114.0 mmol/L        H             

 

             CARBON DIOXIDE (test code = CO2)  mmol/L      21-32                

      

 

             ANION GAP (test code = GAP)              10-20                     

 

 

             GLUCOSE (test code = GLU)  mg/dL                            

 

             BLOOD UREA NITROGEN (test code = BUN)  mg/dL       7-18            

           

 

             GLOMERULAR FILTRATION RATE (test code = GFR)  mL/min      >=60     

                  

 

             CREATININE (test code = CREAT)  mg/dL       0.7-1.3                

    

 

             BUN/CREATININE RATIO (test code = BUN/CREA)              10-20     

                 

 

             CALCIUM (test code = CA)  mg/dL       8.5-10.1                   





BAKGNYKF--49-15 15:30:00* 



             Test Item    Value        Reference Range Interpretation Comments

 

             TROPONIN-I (test code = TROPI)  ng/mL       0-0.045                

    





CBC W/AUTO DIFF2019-09-15 14:47:00* 



             Test Item    Value        Reference Range Interpretation Comments

 

             WHITE BLOOD CELL (test code = WBC) 7.0 K/mm3    4.5-12.5     N     

        

 

             RED BLOOD CELL (test code = RBC) 4.33 mill/mm3 4.0-5.8      N      

       

 

             HEMOGLOBIN (test code = HGB) 14.1 gram/dL 13.0-17.5    N           

  

 

             HEMATOCRIT (test code = HCT) 40.8 %       42.0-52.0    L           

  

 

             MEAN CELL VOLUME (test code = MCV) 94.2 fL      80-98        N     

        

 

             MEAN CELL HGB (test code = MCH) 32.6 picogram 27.0-33.0    N       

      

 

             MEAN CELL HGB CONCETRATION (test code = MCHC) 34.6 gram/dL 33.0-36.

0    N             

 

             RED CELL DISTRIBUTION WIDTH (test code = RDW) 12.3 %       11.6-16.

2    N             

 

             RED CELL DISTRIBUTION WIDTH SD (test code = RDW-SD) 42.7 fL      37

.0-51.0    N             

 

             PLATELET COUNT (test code = PLT) 188 K/mm3    150-450      N       

      

 

             MEAN PLATELET VOLUME (test code = MPV) 10.5 fL      6.7-11.0     N 

            

 

             NEUTROPHIL % (test code = NT%) 72.7 %       39.0-69.0    H         

    

 

             IMMATURE GRANULOCYTE % (test code = IG%) 0.4 %        0.0-5.0      

N             

 

             LYMPHOCYTE % (test code = LY%) 15.8 %       25.0-55.0    L         

    

 

             MONOCYTE % (test code = MO%) 10.2 %       0.0-10.0     H           

  

 

             EOSINOPHIL % (test code = EO%) 0.3 %        0.0-5.0      N         

    

 

             BASOPHIL % (test code = BA%) 0.6 %        0.0-1.0      N           

  

 

             NUCLEATED RBC % (test code = NRBC%) 0.0 %        0-0          N    

         

 

             NEUTROPHIL # (test code = NT#) 5.05 K/mm3   1.8-7.7      N         

    

 

             IMMATURE GRANULOCYTE # (test code = IG#) 0.03 x10 3/uL 0-0.03      

 N             

 

             LYMPHOCYTE # (test code = LY#) 1.10 K/mm3   1.0-5.0      N         

    

 

             MONOCYTE # (test code = MO#) 0.71 K/mm3   0-0.8        N           

  

 

             EOSINOPHIL # (test code = EO#) 0.02 K/mm3   0.0-0.5      N         

    

 

             BASOPHIL # (test code = BA#) 0.04 K/mm3   0.0-0.2      N           

  

 

             NUCLEATED RBC # (test code = NRBC#) 0.00 K/mm3   0.0-0.1      N    

         

 

             MANUAL DIFF REQUIRED (test code = MDIFF) NO                        

              





- CT HEAD/BRAIN W/O CONT2019-09-15 14:47:00  Name: HARPER VENTURA                 
    [facility]                     : 1934 Age/S: 85  / M         
[address]                Unit #: [id number]     Loc:               
[place]  [address]              Phys: Husam Robles MD                       
                           Acct: [id number]  Dis Date:               Status: 
REG ER                                  PHONE #: [phone]     Exam Date: 
09/15/2019  1433                     FAX #: [phone]      Reason: CODE 
STROKE                                         EXAMS:                           
                   CPT CODE:      [id number] CT HEAD/BRAIN W/O CONT              
      [address]                    HISTORY:  CODE STROKE               TECHNIQUE: 
Noncontrast 2.5 mm axial CT of the head. Examination       acquired within 24 
hours of arrival. Automated exposure control for       dose reduction.          
    COMPARISON: None               FINDINGS:               No lacerations or 
contusions of the scalp or facial soft tissues..        Calvarium and skull base
are intact.               No acute hemorrhage. No intracranial mass, mass 
effect, or midline       shift. No effacement of the sulci or grey-white matter 
interface to       suggest acute infarct.                There is diffuse 
cortical atrophy. There is decreased attenuation of       the periventricular 
white matter compatible with microvascular       ischemic changes. There are 
calcifications in the bilateral basal       ganglia.               No hydrocep
halus.. No extra-axial fluid collection.                Visualized paranasal sin
uses are clear.        Mastoid air cells and middle ear cavities are clear.     
  Prior lens extraction bilaterally.                          IMPRESSION:       
           No acute intracranial process.         Diffuse cortical atrophy and 
microvascular ischemic changes of the         white matter.                   P
reliminary findings were reported to Dr. Robles by telephone at 2:45         PM S
[facility] 15, 2019.          ** Electronically Signed by Osmany Valenzuela MD on 09/15/
2019 at 1447 **                      Reported and signed by: Osmany Valenzuela MD     
   PAGE  1                       Signed Report                    (CONTINUED)   
Name: HARPER VENTURA                      [facility]                     : 
1934 Age/S: 85  / M         [address]                Unit #: 
[id number]     Loc:               [place]  [address]              Phys: Noah Robles MD                                                   Acct: [id number]  Dis
Date:               Status: REG ER                                  PHONE #: [phone]
[phone]     Exam Date: 09/15/2019  1433                     FAX #: [phone]
[phone]      Reason: CODE STROKE                                         EXAMS:      
                                        CPT CODE:      [id number] CT HEAD/BRAIN 
W/O CONT                     [address]               <Continued>                    
                  CC: Husam Robles MD                                         
                                                                             
Technologist:Amna Thakur RT(R),CT    CTDI:        DLP:        Trnscb 
Date/Time: 09/15/2019 (3766) [email or web address]                       Orig Print D/T: S:
09/15/2019 (9886)      PAGE  2                       Signed Report              
                PROTHROMBIN TIME2019-09-15 14:46:00* 



             Test Item    Value        Reference Range Interpretation Comments

 

             PROTHROMBIN TIME PATIENT (test code = PTP) 14.0 seconds 9.0-14.0   

  N             

 

             INTERNATIONAL NORMAL RATIO (test code = INR) 1.2          0.8-1.2  

    N            The therapeutic range

for oral anticoagulant therapy formost indications is an international 
normalized ratio (INR)of between 2.0 and 3.0.  The recommended therapeutic 
INRrange for various clinical situations is listed 
below:_________________________________________________________Clinical 
Situation                          INR 
range_________________________________________________________ Pulmonary e
mbolism treatment              (2.0-3.0)Venous thrombosis treatmentVenous 
thrombosis prophylaxis (high risk surgery)Prevention of systemic embolism from: 
       Acute myocardial infarction         Valvular heart disease         Atrial
fibrillation Mechanical prosthetic heart valves          (2.5-3.5)





IS PATIENT ON ANTICOAGULANTS? NTHROMBOPLASTIN TIME PARTIAL2019-09-15 14:46:00* 



             Test Item    Value        Reference Range Interpretation Comments

 

             THROMBOPLASTIN TIME PARTIAL (test code = PTT) 37.3 seconds 25.0-36.

5    H             





IS PATIENT ON ANTICOAGULANTS? N

## 2020-08-18 NOTE — EMERGENCY DEPARTMENT NOTE
History of Present Illnes


History of Present Illness


History of Present Illness


This is a 86 year old  male Chief Complaint Comment Patient in from 

[facility] via EMS with reports of shortness of breath and hypoxia with

sats in the 80s. Patient was previously admitted to [facility] for a fall

and subsequent brain bleed. Patient was discharged from [facility] to [facility] 

and he presents to us lethargic/obtunded with no verbal response and no eye 

opening to any stimuli. Patient hypoxic in the 70s and 80s on 15L non rebreather

mask.


EMS Treatment PTA:  See EMS Report


 Required:  No


Onset (how long ago):  day(s) (1)


Location:  Lungs


Quality:  SoB


Radiation:  Reports non-radiation


Severity:  severe


Onset quality:  gradual


Duration (how long):  day(s) (1)


Timing of current episode:  constant


Progression:  worsening


Chronicity:  new


Context:  Reports recent surgery, Reports trauma/injury


Relieving factors:  none


Exacerbating factors:  none


Associated symptoms:  Reports other (Altered, unresponsive)





Past Medical/Family History


Physician Review


I have reviewed the patient's past medical and family history.  Any updates have

been documented here.





Past Medical History


Other Medical History:  


high cholesterol, acid reflux


Other Surgery:  


cardiac stents, back surgery,





Other


Last Tetanus:  unknown





Review of Systems


ROS Narrative


Unable to obtain ROS:  Unable to obtain due to (Unresonsive)





Physical Exam


Related Data


Allergies:  


Coded Allergies:  


     Tetanus Vaccines & Toxoid (Verified  Allergy, Unknown, 12/7/13)


Vital signs reviewed:  Yes





Physical Exam


CONSTITUTIONAL





Constitutional:  Present well-developed, Present cachectic; 


   Absent well-nourished


HENT


HENT:  Present normocephalic, Present atraumatic, Present oropharynx 

clear/moist, Present nose normal, Present other (C spine in place, post 

inscisional no erythema)


HENT L/R:  Present left ext ear normal, Present right ext ear normal


EYES





Eyes:  Reports PERRL, Reports conjunctivae normal


NECK


Neck:  Present ROM normal


PULMONARY


Pulmonary:  Present breath sounds normal, Present respiratory distress; 


   Absent effort normal


CARDIOVASCULAR





Cardiovascular:  Present regular rhythm, Present heart sounds normal, Present 

capillary refill normal, Present normal rate


GASTROINTESTINAL





Abdominal:  Present soft, Present nontender, Present bowel sounds normal


GENITOURINARY





Genitourinary:  Present exam deferred


SKIN


Skin:  Present warm, Present dry


MUSCULOSKELETAL





Musculoskeletal:  Present ROM normal


NEUROLOGICAL





Neurological:  Present other (Unresponsive); 


   Absent alert, Absent oriented x 3


PSYCHOLOGICAL


Psychological:  Present other (Unresponsive)





Procedures


12 Lead ECG Interpretation


ECG Interpretation :  


   Date:  Aug 18, 2020


   Rhythm:  sinus rhythm


   Rate:  normal


   QRS axis:  left


   ST segments normal:  Yes


   T waves normal:  Yes


   Clinical Impression:  non-specific ECG





Intubation


Time out performed:  Yes


Sedative:  Etomidate


Paralytic:  Succinylcholine


Laryngoscope:  Tobin


Endotracheal tube size:  7.5


ET tube uncuffed:  Yes


Tube secured depth (cm):  26


Tube secured location:  lips


Tube placement confirmation:  visualize tube passing cords, equal breath sounds 

bilaterally, no breath sounds over epigastrium


Patient tolerated procedure:  well


Complications:  hypotension


Additional comments


Push dose epinephrine given x 3





Critical Care Time


Total Critical Care Time (min):  45


Time ED Physician saw patient:  09:10


Critical care time exclusive o:  separately billable procedures


Critcal care necessary due to:  CNS failure or compromise, respiratory failure


Critcal care time spent by me:  interpret cardiac output measures, evaluation 

patient response to tx, examination of patient, obtaining hx from 

patient/surrogate, order/perform tx or interventions, order/review laboratory 

studies, order/review radiographic studies, pulse oximetry, re-evaluation of 

patient condition, review of old charts, ventilator management





Assessment & Plan


Medical Decision Making


MDM


86-year-old male with past medical history significant for recent fall and time 

unknown subarachnoid hemorrhage presents for respiratory distress. Patient was 

initially saturating in the 70s and room air. Nonrebreather brought him up to 

the low 80s he is subsequently intubated. Workup shows elevated white blood cell

count. He was discussed with Dr. Parish Mccain at [place] and will transfer 

ER to ER for Respiratory failure.


Sepsis suspected at time of antibiotic order.


Lactic acid was repeated if initial >2


30cc/kg crystalloid bolus was given. 


Vasopressors were started.





Reassessment


Reassessment time:  11:40


Reassessment


BP stabilized





Assessment & Plan


Final Impression:  


(1) Respiratory failure


Depart Disposition:  TRANS TO OTHER [facility] FACILITY


Home Meds


Reported Medications


Felodipine (FELODIPINE ER) 10 Mg Tab.er.24h, 10 MG PO daily


   12/6/13


Lovastatin (LOVASTATIN) 40 Mg Tablet, 40 MG PO daily


   12/6/13


Metoprolol Tartrate (METOPROLOL TARTRATE) 25 Mg Tablet, 25 MG PO BID


   12/6/13


Omeprazole (PRILOSEC) 20 Mg Capsule.dr, 20 MG PO DAILY


   12/6/13


Lisinopril (LISINOPRIL) 40 Mg Tablet, 40 MG PO daily


   12/6/13


Medications in the ED





Epinephrine HCl 1 ea STK-MED ONCE .ROUTE ;  Start 8/18/20 at 09:28;  Stop 

8/18/20 at 09:22;  Status DC


Sodium Chloride 1,000 ml @   STK-MED ONCE .ROUTE ;  Start 8/18/20 at 09:30;  

Stop 8/18/20 at 09:24;  Status DC











LOUIE LAZCANO MD          Aug 18, 2020 09:38

## 2020-08-18 NOTE — NUR
Patient breathing 40+ times per minute with oxygen saturation in the 70s and 
80s on 15L NRB. ER MD at bedside and the decision was made to intubate. 

0927 - 20 of Etomidate given IV

0928 - 100 of Succinylcholine given IV

0929 - 7.5 ET tube advanced with glydescope assistance to a depth of 26 at the 
lips with good bilateral chest rise and lung sounds

0930 - heart rate 94, Resp 20, BP 78/42, 93%

0931 - 20mcg of epi given IV

0932 - Heart rate 86, /45, resp 24, 94%



Patient placed on ventilator with 100% FIO2 and 20peep.

## 2020-08-18 NOTE — DIAGNOSTIC IMAGING REPORT
EXAMINATION: Head and cervical spine CT without contrast.  



HISTORY: 86-year-old male with alteration of consciousness, confusion, hypoxic

and intubated. Respiratory distress, sepsis.

COMPARISON: None.

TECHNIQUE: Multidetector axial images were obtained without contrast from the

foramen magnum to the vertex and through the cervical spine. Dose modulation,

iterative reconstruction, and/or weight based adjustment of the mA/kV was

utilized to reduce the radiation dose to as low as reasonably achievable.



HEAD CT FINDINGS:

     Skull/scalp: No lytic or blastic lesions.  No fractures. 

     Parenchyma: Few scattered and mildly confluent periventricular white

matter hypodensities, most likely nonspecific chronic microvascular ischemic

changes. No mass, hemorrhage or CT evidence of acute vascular insult.  

     Brain volume: Mild generalized supra and infratentorial volume loss. 

     Ventricles: No hydrocephalus or displacement.  

     Arteries: No density suggestive of thrombus. 

     Dural sinuses: No abnormal density. 

     Extra-axial spaces: No abnormal density. 

     Foramen magnum: No mass, Chiari malformation, or basilar invagination. 

     Sella: No obvious mass.  

     Paranasal/mastoid sinuses: Imaged portions unremarkable. 



CERVICAL SPINE CT FINDINGS:

    Alignment:Normal alignment and lordosis.

    Soft tissues: Partially visualized endotracheal tube..

    Vertebrae:  Status post ACDF from C4 to C6, inferior corpectomy at C4 and

C5 and interbody bone grafting, solid anterior interbody fusion. No hardware

failure/fracture or loosening.



   Degenerative changes:

   C1-C2: Normal

   C2-C3: Uncovertebral and facet arthrosis. Mild bilateral foraminal

narrowing.

   C3-C4: Disc osteophyte complex formation, uncovertebral and facet arthrosis.

Mild spinal canal and moderate foraminal stenoses.

   C4-C6: Few surgical levels. Moderately severe foraminal stenosis

bilaterally.

   C6-C7: Disc osteophyte corpus formation, uncovertebral and facet arthrosis.

Moderate spinal canal and bilateral foraminal stenoses.

   C7-T1: Mild bilateral facet arthrosis. Mild foraminal narrowing.



IMPRESSION:



Head CT:

1.  No acute intracranial hemorrhage or cortical infarcts.

2.  Mild chronic microvascular ischemic changes.



Cervical spine CT:

1.  No acute fractures or dislocations.

2.  Chronic degenerative changes as described.

3.  Status post ACDF from C4 to C6 as above.



Note: Acute post traumatic spinal cord, vascular or ligamentous injury cannot

adequately be assessed with CT.





Signed by: Dr. Kemi Childress M.D. on 8/18/2020 11:25 AM

## 2020-08-18 NOTE — DIAGNOSTIC IMAGING REPORT
EXAMINATION:  CHEST SINGLE (NOT PORTABLE)    



INDICATION: Sepsis



COMPARISON: None

     

FINDINGS:



LINES/TUBES:Endotracheal tube terminates 4 cm above the trista. EKG leads

overlie the chest.



LUNGS:The lungs are well-inflated. Mild bibasilar opacities. 



PLEURA:No pleural effusion or pneumothorax.



MEDIASTINUM:The cardiomediastinal silhouette appears normal in size and shape.

Atherosclerotic calcifications of the thoracic aorta.



BONES/SOFT TISSUES:No acute osseous injury. Sternotomy wires in place.



ABDOMEN:No free air under the diaphragm.





IMPRESSION: 

Endotracheal tube terminates 4 cm above the trista.



Mild bibasilar opacities may represent subsegmental atelectasis or superimposed

aspiration or pneumonia. 



Signed by: Luis F Thakkar MD on 8/18/2020 11:22 AM

## 2020-08-18 NOTE — DIAGNOSTIC IMAGING REPORT
EXAM: CT Chest WITHOUT intravenous contrast 8/18/2020 10:37 AM

INDICATION:  Shortness of breath, altered mental status

COMPARISON: Chest radiograph of the same day

TECHNIQUE:

Chest was scanned utilizing a multidetector helical scanner from the lung apex

through the level of the adrenal glands without administration of IV contrast.

Coronal and sagittal reformations were obtained. Routine protocol was

performed.



IV CONTRAST: None

RADIATION DOSE: Total DLP: 495 mGy*cm. Dose modulation, iterative

reconstruction, and/or weight based adjustment of the mA/kV was utilized to

reduce the radiation dose to as low as reasonably achievable. 

COMPLICATIONS: None



FINDINGS:



LINES/ TUBES: Endotracheal tube terminates in the midthoracic trachea.



LUNGS AND AIRWAYS:  The central airways are patent.  Groundglass and

consolidative opacities in the dependent portions of the right greater than

left lower lobes. Mild scattered left upper lobe and right middle lobe

groundglass opacities.



PLEURA: The pleural spaces are clear.



HEART AND MEDIASTINUM: The thyroid gland is normal.  No supraclavicular,

axillary, mediastinal or hilar lymphadenopathy. The heart is not enlarged. No

pericardial effusion. Diffuse atherosclerotic calcifications involve the

thoracic aorta, coronary arteries, and proximal great vessels.  



UPPER ABDOMEN: Limited noncontrast images of the upper abdomen demonstrate no

acute abnormality. 4.5 cm exophytic right upper pole renal cyst.



BONES: No acute osseous injury. No suspicious lytic or blastic lesions.

Sternotomy wires in place.



SOFT TISSUES: Unremarkable.



IMPRESSION: 

Multifocal ground glass and consolidative opacities, most notably at the right

greater than left dependent lower lobes. Findings are concerning for multifocal

pneumonia.



Diffuse atherosclerotic arterial calcifications including of the coronary

arteries.





Signed by: Luis F Thakkar MD on 8/18/2020 11:28 AM

## 2020-08-18 NOTE — NUR
Attempted to call patient's emergency contacts Gregorio Bal (son) and Greta Bal 
(Spouse) for verbal consent to transfer. No answer at either number. No message 
left due to no way to ensure correct information.